# Patient Record
Sex: MALE | Race: WHITE | Employment: OTHER | URBAN - METROPOLITAN AREA
[De-identification: names, ages, dates, MRNs, and addresses within clinical notes are randomized per-mention and may not be internally consistent; named-entity substitution may affect disease eponyms.]

---

## 2022-02-11 ENCOUNTER — OFFICE VISIT (OUTPATIENT)
Dept: SURGERY | Age: 67
End: 2022-02-11
Payer: COMMERCIAL

## 2022-02-11 VITALS
HEIGHT: 70 IN | OXYGEN SATURATION: 97 % | HEART RATE: 84 BPM | BODY MASS INDEX: 28.35 KG/M2 | WEIGHT: 198 LBS | RESPIRATION RATE: 18 BRPM | DIASTOLIC BLOOD PRESSURE: 70 MMHG | SYSTOLIC BLOOD PRESSURE: 104 MMHG | TEMPERATURE: 97.3 F

## 2022-02-11 DIAGNOSIS — Z01.818 PREOP TESTING: Primary | ICD-10-CM

## 2022-02-11 PROCEDURE — 99205 OFFICE O/P NEW HI 60 MIN: CPT | Performed by: SURGERY

## 2022-02-11 RX ORDER — PREGABALIN 25 MG/1
25 CAPSULE ORAL 2 TIMES DAILY
COMMUNITY
Start: 2022-01-31

## 2022-02-11 RX ORDER — SERTRALINE HYDROCHLORIDE 100 MG/1
100 TABLET, FILM COATED ORAL DAILY
COMMUNITY
Start: 2021-10-01

## 2022-02-11 NOTE — PROGRESS NOTES
Consult    Patient: Meche Cabrera MRN: [id number]  SSN: [id number]    YOB: 1955  Age: 61 y.o. Sex: male      Subjective: Meche Cabrera is a 61 y.o. white male who presents in referral from [facility]. The patient notes a several decade history of intermittent right upper quadrant abdominal pain with associated bloating but has noted over the last 3 months 3 episodes which have greatly intensified and both frequency and symptomatology. The patient notes that the discomfort to occur abruptly in the epigastric region with radiation to his back with associated nausea and bloating region duration from 30 minutes to 12 hours. The patient notes no exacerbating or relieving circumstances and does admit to history of to collect urine and light stools. He was seen by Dr. Anna Sanchez and evaluation has documented the fact that he has acute/chronic potentially of symptoms calculus cholecystitis with a markedly thickened gallbladder wall on CT of 1.1 cm patient currently has minimal symptoms and presents for discussion of the options of L4 management  Allergies: Ampicillin, codeine  Current medications: See medication list  Past medical history:  1. History of colon polyps  2. History of stage II Hodgkin's disease status post chemotherapy   3. Basal cell carcinoma right forearm status post excision  4. Benign prostatic hypertrophy  5. Depression  6. Gastroesophageal reflux disease  7. Erectile dysfunction    Past surgical history:  1. Open right inguinal repair utilizing mesh   2. Placement of Mediport   3. Right shoulder replacement   4. Excision right forearm basal cell carcinoma  5. Carpal tunnel release  Social history: Patient does not utilize tobacco, admits to approximately 2 ounces of alcohol on daily basis  Family history:   Mother 82-adult-onset obese mellitus  Father  58 complications of a bowel obstruction  Sister 77 as healthy  One half siblings x5-healthy    Allergies   Allergen Reactions    Ampicillin Diarrhea    Codeine Nausea Only     Current Outpatient Medications   Medication Sig Dispense Refill    pregabalin (LYRICA) 25 mg capsule Take 25 mg by mouth two (2) times a day.  sertraline (ZOLOFT) 100 mg tablet Take 100 mg by mouth daily.  tamsulosin (FLOMAX) 0.4 mg capsule Take 1 Cap by mouth daily (after dinner). 90 Cap 3    multivitamin (ONE A DAY) tablet Take 1 Tab by mouth daily.  esomeprazole (NEXIUM) 40 mg capsule Take 40 mg by mouth two (2) times a day.  celecoxib (CELEBREX) 100 mg capsule Take  by mouth two (2) times a day. (Patient not taking: Reported on 2/11/2022)      diclofenac (VOLTAREN) 1 % gel  (Patient not taking: Reported on 2/11/2022)       Past Medical History:   Diagnosis Date    Acid reflux     Arthritis     Basal cell carcinoma of skin     Burning with urination     Chronic pain     in hands    Elevated PSA     Erectile dysfunction     Hemorrhoids     Hodgkin's lymphoma ([facility] [address]     Lower urinary tract symptoms (LUTS)      Past Surgical History:   Procedure Laterality Date    HX CARPAL TUNNEL RELEASE      HX HERNIA REPAIR      right inguinal repair    HX ORTHOPAEDIC      HX SHOULDER REPLACEMENT  04/28/2010    right    HX UROLOGICAL  07/11/2019    pnbx-trus vol 75.2cc. path benign. Dr. Margie aWlsh. Social History     Tobacco Use    Smoking status: Never Smoker    Smokeless tobacco: Never Used   Substance Use Topics    Alcohol use: Yes     Alcohol/week: 6.0 - 7.0 standard drinks     Types: 6 - 7 Cans of beer per week     Comment: weekly     Family History   Problem Relation Age of Onset    Hypertension Mother     Diabetes Maternal Grandfather            Review of Systems:    General: Denies fevers, chills, night sweats, fatigue, weight loss, or weight gain.     HEENT: Denies changes in auditory or visual acuity, recurrent pharyngitis, epistaxis, chronic rhinorrhea, vertigo    Respiratory: Denies increasing shortness of breath, productive cough, hemoptysis    Cardiac: Denies known history of cardiac disease, heart murmur, palpitations    GI: Denies dysphagia, recurrent emesis, hematemesis, changes in bowel habits, hematochezia, melena    : Denies hematuria frequency urgency dysuria    Musculoskeletal: Denies fractures, dislocations    Neurologic: Denies history of CVA, paralysis paresthesias, recurrent cephalgia, seizures    Endocrine: Denies polyuria, polydipsia, polyphagia, heat and cold intolerance    Lymph/heme: Denies a history of malignancy, anemia, bruising, blood transfusions    Integumentary: Negative for dermatitis     Objective:     Vitals:    02/11/22 1251   BP: 104/70   Pulse: 84   Resp: 18   Temp: 97.3 °F (36.3 °C)   SpO2: 97%   Weight: 89.8 kg (198 lb)   Height: 5' 10\" (1.778 m)        General: no acute distress, nontoxic in appearance. Head: Normocephalic, atraumatic  Mouth: Clear, no overt lesions, oral mucosa is pink and moist.  Neck: Supple, no masses, no adenopathy or carotid bruits, trachea midline  Resp: Clear to auscultation bilaterally, no wheezing, rhonchi, or rales, excursions normal and symmetrical.  Cardio: Regular rate and rhythm, no murmurs, clicks, gallops, or rubs. Abdomen: soft, nontender, nondistended, normoactive bowel sounds, no hernias. Extremities: Warm, well perfused, no tenderness or swelling, normal gait/station, without edema or varicosities  Neuro: Sensation and strength grossly intact and symmetrical.  Psych: Alert and oriented to person, place, and time.     CBC, CMP January 20, 2022 alkaline phosphatase 229, total bilirubin 1.5, white blood cell count 14.1  February 8, 2020 EGD-unremarkable  February 8, 2022 abdominal CT 1.1 cm although wall thickening with pericholecystic inflammatory changes with the potential for both acute/chronic emphysematous cholecystitis      Assessment:      Subacute/chronic calculus cholecystitis      Plan:     Avoid fatty foods  Complete antibiotic regimen  We will schedule laparoscopic open cholecystectomy with intraoperative cholangiography in approximately 1 month after the inflammatory changes have resolved as an outpatient at [facility]    Signed By: Meaghan Don DO     February 11, 2022

## 2022-02-16 ENCOUNTER — TELEPHONE (OUTPATIENT)
Dept: SURGERY | Age: 67
End: 2022-02-16

## 2022-02-16 DIAGNOSIS — R10.10 PAIN OF UPPER ABDOMEN: ICD-10-CM

## 2022-02-16 DIAGNOSIS — K80.20 GALLSTONES: ICD-10-CM

## 2022-02-16 DIAGNOSIS — Z01.818 PREOP TESTING: Primary | ICD-10-CM

## 2022-02-16 RX ORDER — AMOXICILLIN AND CLAVULANATE POTASSIUM 562.5; 437.5; 62.5 MG/1; MG/1; MG/1
1 TABLET, FILM COATED, EXTENDED RELEASE ORAL 2 TIMES DAILY
Qty: 20 TABLET | Refills: 0 | Status: SHIPPED | OUTPATIENT
Start: 2022-02-16 | End: 2022-03-07 | Stop reason: ALTCHOICE

## 2022-02-16 NOTE — TELEPHONE ENCOUNTER
Pt made aware rx for augmentin sent to his pharmacy questioned regarding reaction to ampicillin in past and he confirms it just gave him diarrhea no other reactions

## 2022-02-28 ENCOUNTER — HOSPITAL ENCOUNTER (OUTPATIENT)
Dept: LAB | Age: 67
Discharge: HOME | End: 2022-02-28

## 2022-02-28 ENCOUNTER — HOSPITAL ENCOUNTER (OUTPATIENT)
Dept: GENERAL RADIOLOGY | Age: 67
Discharge: HOME | End: 2022-02-28

## 2022-02-28 DIAGNOSIS — Z01.818 PREOP TESTING: ICD-10-CM

## 2022-02-28 LAB
ATRIAL RATE: 101 BPM
CALCULATED P AXIS, ECG09: 37 DEGREES
CALCULATED R AXIS, ECG10: 42 DEGREES
CALCULATED T AXIS, ECG11: 25 DEGREES
DIAGNOSIS, 93000: NORMAL
P-R INTERVAL, ECG05: 154 MS
Q-T INTERVAL, ECG07: 354 MS
QRS DURATION, ECG06: 78 MS
QTC CALCULATION (BEZET), ECG08: 459 MS
SENTARA SPECIMEN COL,SENBCF: NORMAL
VENTRICULAR RATE, ECG03: 101 BPM

## 2022-02-28 PROCEDURE — 99001 SPECIMEN HANDLING PT-LAB: CPT

## 2022-02-28 PROCEDURE — 71046 X-RAY EXAM CHEST 2 VIEWS: CPT

## 2022-02-28 PROCEDURE — 93005 ELECTROCARDIOGRAM TRACING: CPT

## 2022-03-01 LAB
A-G RATIO,AGRAT: 2.6 RATIO (ref 1.1–2.6)
ABSOLUTE LYMPHOCYTE COUNT, 10803: 1.7 K/UL (ref 1–4.8)
ALBUMIN SERPL-MCNC: 4.6 G/DL (ref 3.5–5)
ALP SERPL-CCNC: 116 U/L (ref 40–125)
ALT SERPL-CCNC: 31 U/L (ref 5–40)
ANION GAP SERPL CALC-SCNC: 13 MMOL/L (ref 3–15)
AST SERPL W P-5'-P-CCNC: 32 U/L (ref 10–37)
BASOPHILS # BLD: 0 K/UL (ref 0–0.2)
BASOPHILS NFR BLD: 1 % (ref 0–2)
BILIRUB SERPL-MCNC: 0.4 MG/DL (ref 0.2–1.2)
BUN SERPL-MCNC: 21 MG/DL (ref 6–22)
CALCIUM SERPL-MCNC: 9.1 MG/DL (ref 8.4–10.5)
CHLORIDE SERPL-SCNC: 103 MMOL/L (ref 98–110)
CO2 SERPL-SCNC: 24 MMOL/L (ref 20–32)
CREAT SERPL-MCNC: 0.9 MG/DL (ref 0.8–1.6)
EOSINOPHIL # BLD: 0.3 K/UL (ref 0–0.5)
EOSINOPHIL NFR BLD: 6 % (ref 0–6)
ERYTHROCYTE [DISTWIDTH] IN BLOOD BY AUTOMATED COUNT: 13.7 % (ref 10–15.5)
GFRAA, 66117: >60
GFRNA, 66118: >60
GLOBULIN,GLOB: 1.8 G/DL (ref 2–4)
GLUCOSE SERPL-MCNC: 90 MG/DL (ref 70–99)
GRANULOCYTES,GRANS: 59 % (ref 40–75)
HCT VFR BLD AUTO: 45 % (ref 39.3–51.6)
HGB BLD-MCNC: 14.5 G/DL (ref 13.1–17.2)
LYMPHOCYTES, LYMLT: 28 % (ref 20–45)
MCH RBC QN AUTO: 31 PG (ref 26–34)
MCHC RBC AUTO-ENTMCNC: 32 G/DL (ref 31–36)
MCV RBC AUTO: 96 FL (ref 80–95)
MONOCYTES # BLD: 0.4 K/UL (ref 0.1–1)
MONOCYTES NFR BLD: 7 % (ref 3–12)
NEUTROPHILS # BLD AUTO: 3.5 K/UL (ref 1.8–7.7)
PLATELET # BLD AUTO: 232 K/UL (ref 140–440)
PMV BLD AUTO: 10.9 FL (ref 9–13)
POTASSIUM SERPL-SCNC: 4.4 MMOL/L (ref 3.5–5.5)
PROT SERPL-MCNC: 6.4 G/DL (ref 6.2–8.1)
RBC # BLD AUTO: 4.71 M/UL (ref 3.8–5.8)
SODIUM SERPL-SCNC: 140 MMOL/L (ref 133–145)
WBC # BLD AUTO: 6 K/UL (ref 4–11)

## 2022-03-02 ENCOUNTER — TELEPHONE (OUTPATIENT)
Dept: SURGERY | Age: 67
End: 2022-03-02

## 2022-03-02 NOTE — TELEPHONE ENCOUNTER
Pt made aware ekg abnormal and we have scheduled appt for cardiac clearance with dr Yasmany Jones for Monday 1145 march 7

## 2022-03-07 ENCOUNTER — ANESTHESIA EVENT (OUTPATIENT)
Dept: SURGERY | Age: 67
End: 2022-03-07
Payer: COMMERCIAL

## 2022-03-07 ENCOUNTER — HOSPITAL ENCOUNTER (OUTPATIENT)
Dept: LAB | Age: 67
Discharge: HOME | End: 2022-03-07
Payer: COMMERCIAL

## 2022-03-07 ENCOUNTER — OFFICE VISIT (OUTPATIENT)
Dept: CARDIOLOGY CLINIC | Age: 67
End: 2022-03-07
Payer: COMMERCIAL

## 2022-03-07 VITALS
DIASTOLIC BLOOD PRESSURE: 80 MMHG | SYSTOLIC BLOOD PRESSURE: 120 MMHG | BODY MASS INDEX: 29.7 KG/M2 | OXYGEN SATURATION: 96 % | HEART RATE: 88 BPM | WEIGHT: 207 LBS

## 2022-03-07 DIAGNOSIS — Z01.818 PRE-OP EVALUATION: Primary | ICD-10-CM

## 2022-03-07 LAB
COVID-19 RAPID TEST, COVR: NOT DETECTED
SOURCE, COVRS: NORMAL

## 2022-03-07 PROCEDURE — 99203 OFFICE O/P NEW LOW 30 MIN: CPT | Performed by: INTERNAL MEDICINE

## 2022-03-07 PROCEDURE — 87635 SARS-COV-2 COVID-19 AMP PRB: CPT

## 2022-03-07 NOTE — LETTER
3/7/2022 11:52 AM          Mr. Yovana Landeros  [address]  [address]            Yovana Landeros was seen in our office on March 7, 2022 for cardiac evaluation. From a cardiac standpoint he is low risk for his gall bladder surgery. Please feel free to contact our office if you have any questions regarding this patient.          Sincerely,              Abhay Escudero MD

## 2022-03-07 NOTE — PROGRESS NOTES
[facility]    Mr. Bianca Redding is a 81-year-old male with a history of Hodgkin's lymphoma, DJD, calculus cholecystitis    Patient is here today for cardiac evaluation. He denies any prior history of MI and CHF. Patient was asked to see me because of abnormal EKG which was performed preoperatively. Patient has calculus cholecystitis. He is planning to have laparoscopic gallbladder surgery. EKG was performed and was abnormal.  Patient is any previous cardiac complaint. He denies any chest pain or chest tightness. He is very active. He is able to perform activity of daily living as well as exertional activities without any limitation. He can walk several miles without having to stop. He can climb more than 5-10 flight of stairs without any complaint. He denies any palpitation, presyncope or syncope. He has no limitation to his activity he can perform. Denies any nausea, vomiting, abdominal pain, fever, chills, sputum production. No hematuria or other bleeding complaints    Past Medical History:   Diagnosis Date    Acid reflux     Arthritis     Basal cell carcinoma of skin     Burning with urination     Chronic pain     in hands    Elevated PSA     Erectile dysfunction     Hemorrhoids     Hodgkin's lymphoma (HCC)     Lower urinary tract symptoms (LUTS)        Review of Systems:  Cardiac symptoms as noted above in HPI. All others negative. Denies fatigue, malaise, skin rash, joint pain, blurring vision, photophobia, neck pain, hemoptysis, chronic cough, nausea, vomiting, hematuria, burning micturition, BRBPR, chronic headaches. Current Outpatient Medications   Medication Sig    pregabalin (LYRICA) 25 mg capsule Take 25 mg by mouth two (2) times a day.  sertraline (ZOLOFT) 100 mg tablet Take 100 mg by mouth daily.  tamsulosin (FLOMAX) 0.4 mg capsule Take 1 Cap by mouth daily (after dinner).     multivitamin (ONE A DAY) tablet Take 1 Tab by mouth daily.  esomeprazole (NEXIUM) 40 mg capsule Take 40 mg by mouth two (2) times a day.  celecoxib (CELEBREX) 100 mg capsule Take  by mouth two (2) times a day. (Patient not taking: Reported on 2/11/2022)     No current facility-administered medications for this visit. Past Surgical History:   Procedure Laterality Date    HX CARPAL TUNNEL RELEASE      HX HERNIA REPAIR      right inguinal repair    HX ORTHOPAEDIC      HX SHOULDER REPLACEMENT  04/28/2010    right    HX UROLOGICAL  07/11/2019    pnbx-trus vol 75.2cc. path benign. Dr. Una Mcmullen. Allergies and Sensitivities:  Allergies   Allergen Reactions    Ampicillin Diarrhea    Codeine Nausea Only       Family History:  Family History   Problem Relation Age of Onset    Hypertension Mother     Diabetes Maternal Grandfather        Social History:  Social History     Tobacco Use    Smoking status: Never Smoker    Smokeless tobacco: Never Used   Substance Use Topics    Alcohol use: Yes     Alcohol/week: 6.0 - 7.0 standard drinks     Types: 6 - 7 Cans of beer per week     Comment: weekly    Drug use: No     He  reports that he has never smoked. He has never used smokeless tobacco.  He  reports current alcohol use of about 6.0 - 7.0 standard drinks of alcohol per week. Physical Exam:  BP Readings from Last 3 Encounters:   03/07/22 120/80   02/11/22 104/70   04/26/21 (!) 153/110         Pulse Readings from Last 3 Encounters:   03/07/22 88   02/11/22 84   04/26/21 94          Wt Readings from Last 3 Encounters:   03/07/22 93.9 kg (207 lb)   02/11/22 89.8 kg (198 lb)   08/06/19 106.6 kg (235 lb)       Constitutional: Oriented to person, place, and time. HENT: Head: Normocephalic and atraumatic. Eyes: Conjunctivae and extraocular motions are normal.   Neck: No JVD present. Carotid bruit is not appreciated. Cardiovascular: Regular rhythm.    No murmur, gallop or rubs appreciated  Lung: Breath sounds normal. No respiratory distress. No ronchi or rales appreciated  Abdominal: No tenderness. No rebound and no guarding. Musculoskeletal: There is no lower extremity edema. No cynosis  Lymphadenopathy:  No cervical or supraclavicular adenopathy appriciated. Neurological: No gross motor deficit noted. Skin: No visible skin rash noted. No Ear discharge noted  Psychiatric: Normal mood and affect. LABS:   @  Lab Results   Component Value Date/Time    WBC 6.0 2022 01:13 PM    HGB 14.5 2022 01:13 PM    HCT 45.0 2022 01:13 PM    PLATELET 372  01:13 PM    MCV 96 (H) 2022 01:13 PM     Lab Results   Component Value Date/Time    Sodium 140 2022 01:13 PM    Potassium 4.4 2022 01:13 PM    Chloride 103 2022 01:13 PM    CO2 24 2022 01:13 PM    Glucose 90 2022 01:13 PM    BUN 21 2022 01:13 PM    Creatinine 0.9 2022 01:13 PM     No flowsheet data found. Lab Results   Component Value Date/Time    ALT (SGPT) 31 2022 01:13 PM     No results found for: HBA1C, HIT0WMKU, QFH8EFWC  No results found for: TSH, TSH2, TSH3, TSHP, TSHEXT    EK2022:Sinus rhythm at 100 bpm.  No ST changes of ischemia. Poor R wave progression. No Q waves    STRESS TEST (EST, PHARM, NUC, ECHO etc)    CATHETERIZATION    IMPRESSION & PLAN:  Mr. Bianca Redding is a 82-year-old male    Preoperative evaluation:  Patient is planning to have laparoscopic/open gallbladder surgery for subacute/chronic calculus cholecystitis. EKG was performed which showed poor R wave progression. I reviewed personally that EKG. There is no pathologic Q waves. There is no ST changes of ischemia  Patient is asymptomatic from cardiovascular standpoint. He does not have any symptoms concerning for unstable angina or decompensated heart failure but there is no evidence of fluid overload. He denies any prior history of MI, CHF, DM, CKD or stroke.   In absence of any cardiac symptoms, no further cardiac work-up is necessary. His functional status appears to be more than 6 METS based on history  Patient is acceptable candidate to proceed with planned surgery without any further cardiac work-up at this time in absence of any cardiac symptoms whatsoever    Importance of diet and exercise was discussed with patient. This plan was discussed with patient who is in agreement. Thank you for allowing me to participate in patient care. Please feel free to call me if you have any question or concern. Fazal Dhillon MD  Please note: This document has been produced using voice recognition software. Unrecognized errors in transcription may be present.

## 2022-03-07 NOTE — Clinical Note
3/7/2022    Patient: Lauren Lopez   YOB: 1955   Date of Visit: 3/7/2022     Evan Field MD  [address] [facility] [address] [address]  Via Fax: [phone]    Dear Evan Field MD,      Thank you for referring Mr. Lauren Lopez to [address] [place] [address] for evaluation. My notes for this consultation are attached. If you have questions, please do not hesitate to call me. I look forward to following your patient along with you.       Sincerely,    Hannah Rees MD

## 2022-03-08 ENCOUNTER — APPOINTMENT (OUTPATIENT)
Dept: GENERAL RADIOLOGY | Age: 67
End: 2022-03-08
Attending: SURGERY
Payer: COMMERCIAL

## 2022-03-08 ENCOUNTER — ANESTHESIA (OUTPATIENT)
Dept: SURGERY | Age: 67
End: 2022-03-08
Payer: COMMERCIAL

## 2022-03-08 ENCOUNTER — HOSPITAL ENCOUNTER (OUTPATIENT)
Age: 67
Discharge: HOME | End: 2022-03-08
Attending: SURGERY | Admitting: SURGERY
Payer: COMMERCIAL

## 2022-03-08 VITALS
HEART RATE: 70 BPM | WEIGHT: 204 LBS | TEMPERATURE: 97.9 F | DIASTOLIC BLOOD PRESSURE: 69 MMHG | RESPIRATION RATE: 16 BRPM | OXYGEN SATURATION: 94 % | SYSTOLIC BLOOD PRESSURE: 106 MMHG | BODY MASS INDEX: 29.2 KG/M2 | HEIGHT: 70 IN

## 2022-03-08 DIAGNOSIS — Z90.49 STATUS POST LAPAROSCOPIC CHOLECYSTECTOMY: ICD-10-CM

## 2022-03-08 DIAGNOSIS — K80.10 CHRONIC CHOLECYSTITIS WITH CALCULUS: Primary | ICD-10-CM

## 2022-03-08 PROCEDURE — 76010000131 HC OR TIME 2 TO 2.5 HR: Performed by: SURGERY

## 2022-03-08 PROCEDURE — 00790 ANES IPER UPR ABD NOS: CPT | Performed by: NURSE ANESTHETIST, CERTIFIED REGISTERED

## 2022-03-08 PROCEDURE — 74011250637 HC RX REV CODE- 250/637: Performed by: SURGERY

## 2022-03-08 PROCEDURE — 77030009851 HC PCH RTVR ENDOSC AMR -B: Performed by: SURGERY

## 2022-03-08 PROCEDURE — 77030010031 HC SCIS ENDOSC MPLR J&J -C: Performed by: SURGERY

## 2022-03-08 PROCEDURE — 77030018875 HC APPL CLP LIG4 J&J -B: Performed by: SURGERY

## 2022-03-08 PROCEDURE — 74011000636 HC RX REV CODE- 636: Performed by: SURGERY

## 2022-03-08 PROCEDURE — 77030018836 HC SOL IRR NACL ICUM -A: Performed by: SURGERY

## 2022-03-08 PROCEDURE — 77030031139 HC SUT VCRL2 J&J -A: Performed by: SURGERY

## 2022-03-08 PROCEDURE — 77030008602 HC TRCR ENDOSC EPATH J&J -B: Performed by: SURGERY

## 2022-03-08 PROCEDURE — C9290 INJ, BUPIVACAINE LIPOSOME: HCPCS | Performed by: SURGERY

## 2022-03-08 PROCEDURE — 74011000250 HC RX REV CODE- 250: Performed by: NURSE ANESTHETIST, CERTIFIED REGISTERED

## 2022-03-08 PROCEDURE — 74011250636 HC RX REV CODE- 250/636: Performed by: NURSE ANESTHETIST, CERTIFIED REGISTERED

## 2022-03-08 PROCEDURE — 77030002933 HC SUT MCRYL J&J -A: Performed by: SURGERY

## 2022-03-08 PROCEDURE — 76060000035 HC ANESTHESIA 2 TO 2.5 HR: Performed by: SURGERY

## 2022-03-08 PROCEDURE — 77030012407 HC DRN WND BARD -B: Performed by: SURGERY

## 2022-03-08 PROCEDURE — 47563 LAPARO CHOLECYSTECTOMY/GRAPH: CPT | Performed by: SURGERY

## 2022-03-08 PROCEDURE — 76210000016 HC OR PH I REC 1 TO 1.5 HR: Performed by: SURGERY

## 2022-03-08 PROCEDURE — 77030040922 HC BLNKT HYPOTHRM STRY -A: Performed by: SURGERY

## 2022-03-08 PROCEDURE — 74011000272 HC RX REV CODE- 272: Performed by: SURGERY

## 2022-03-08 PROCEDURE — 77030040361 HC SLV COMPR DVT MDII -B: Performed by: SURGERY

## 2022-03-08 PROCEDURE — 77030008603 HC TRCR ENDOSC EPATH J&J -C: Performed by: SURGERY

## 2022-03-08 PROCEDURE — 77030013079 HC BLNKT BAIR HGGR 3M -A: Performed by: ANESTHESIOLOGY

## 2022-03-08 PROCEDURE — 74011000258 HC RX REV CODE- 258: Performed by: NURSE ANESTHETIST, CERTIFIED REGISTERED

## 2022-03-08 PROCEDURE — 74300 X-RAY BILE DUCTS/PANCREAS: CPT

## 2022-03-08 PROCEDURE — 00790 ANES IPER UPR ABD NOS: CPT | Performed by: ANESTHESIOLOGY

## 2022-03-08 PROCEDURE — 2709999900 HC NON-CHARGEABLE SUPPLY: Performed by: SURGERY

## 2022-03-08 PROCEDURE — 77030008756 HC TU IRR SUC STRY -B: Performed by: SURGERY

## 2022-03-08 PROCEDURE — C1758 CATHETER, URETERAL: HCPCS | Performed by: SURGERY

## 2022-03-08 PROCEDURE — 74011000258 HC RX REV CODE- 258: Performed by: SURGERY

## 2022-03-08 PROCEDURE — 74011250636 HC RX REV CODE- 250/636: Performed by: SURGERY

## 2022-03-08 PROCEDURE — 88304 TISSUE EXAM BY PATHOLOGIST: CPT

## 2022-03-08 PROCEDURE — 76210000021 HC REC RM PH II 0.5 TO 1 HR: Performed by: SURGERY

## 2022-03-08 PROCEDURE — 77030008683 HC TU ET CUF COVD -A: Performed by: ANESTHESIOLOGY

## 2022-03-08 PROCEDURE — 74011000250 HC RX REV CODE- 250: Performed by: SURGERY

## 2022-03-08 RX ORDER — GLYCOPYRROLATE 0.2 MG/ML
INJECTION INTRAMUSCULAR; INTRAVENOUS AS NEEDED
Status: DISCONTINUED | OUTPATIENT
Start: 2022-03-08 | End: 2022-03-08 | Stop reason: HOSPADM

## 2022-03-08 RX ORDER — CHLORHEXIDINE GLUCONATE 4 G/100ML
SOLUTION TOPICAL AS NEEDED
Status: DISCONTINUED | OUTPATIENT
Start: 2022-03-08 | End: 2022-03-08 | Stop reason: HOSPADM

## 2022-03-08 RX ORDER — IBUPROFEN 800 MG/1
800 TABLET ORAL
Qty: 30 TABLET | Refills: 1 | Status: SHIPPED | OUTPATIENT
Start: 2022-03-08

## 2022-03-08 RX ORDER — SODIUM CHLORIDE, SODIUM LACTATE, POTASSIUM CHLORIDE, CALCIUM CHLORIDE 600; 310; 30; 20 MG/100ML; MG/100ML; MG/100ML; MG/100ML
150 INJECTION, SOLUTION INTRAVENOUS CONTINUOUS
Status: DISCONTINUED | OUTPATIENT
Start: 2022-03-08 | End: 2022-03-08 | Stop reason: HOSPADM

## 2022-03-08 RX ORDER — OXYCODONE AND ACETAMINOPHEN 5; 325 MG/1; MG/1
1 TABLET ORAL
Qty: 15 TABLET | Refills: 0 | Status: SHIPPED | OUTPATIENT
Start: 2022-03-08 | End: 2022-03-11

## 2022-03-08 RX ORDER — ONDANSETRON 2 MG/ML
INJECTION INTRAMUSCULAR; INTRAVENOUS AS NEEDED
Status: DISCONTINUED | OUTPATIENT
Start: 2022-03-08 | End: 2022-03-08 | Stop reason: HOSPADM

## 2022-03-08 RX ORDER — DIPHENHYDRAMINE HYDROCHLORIDE 50 MG/ML
12.5 INJECTION, SOLUTION INTRAMUSCULAR; INTRAVENOUS
Status: DISCONTINUED | OUTPATIENT
Start: 2022-03-08 | End: 2022-03-08 | Stop reason: HOSPADM

## 2022-03-08 RX ORDER — LIDOCAINE HYDROCHLORIDE 20 MG/ML
INJECTION, SOLUTION EPIDURAL; INFILTRATION; INTRACAUDAL; PERINEURAL AS NEEDED
Status: DISCONTINUED | OUTPATIENT
Start: 2022-03-08 | End: 2022-03-08 | Stop reason: HOSPADM

## 2022-03-08 RX ORDER — SODIUM CHLORIDE, SODIUM LACTATE, POTASSIUM CHLORIDE, CALCIUM CHLORIDE 600; 310; 30; 20 MG/100ML; MG/100ML; MG/100ML; MG/100ML
50 INJECTION, SOLUTION INTRAVENOUS CONTINUOUS
Status: DISCONTINUED | OUTPATIENT
Start: 2022-03-08 | End: 2022-03-08 | Stop reason: HOSPADM

## 2022-03-08 RX ORDER — OXYCODONE AND ACETAMINOPHEN 5; 325 MG/1; MG/1
1 TABLET ORAL ONCE
Status: COMPLETED | OUTPATIENT
Start: 2022-03-08 | End: 2022-03-08

## 2022-03-08 RX ORDER — ONDANSETRON 2 MG/ML
4 INJECTION INTRAMUSCULAR; INTRAVENOUS ONCE
Status: COMPLETED | OUTPATIENT
Start: 2022-03-08 | End: 2022-03-08

## 2022-03-08 RX ORDER — FENTANYL CITRATE 50 UG/ML
INJECTION, SOLUTION INTRAMUSCULAR; INTRAVENOUS AS NEEDED
Status: DISCONTINUED | OUTPATIENT
Start: 2022-03-08 | End: 2022-03-08 | Stop reason: HOSPADM

## 2022-03-08 RX ORDER — SODIUM CHLORIDE, SODIUM LACTATE, POTASSIUM CHLORIDE, CALCIUM CHLORIDE 600; 310; 30; 20 MG/100ML; MG/100ML; MG/100ML; MG/100ML
100 INJECTION, SOLUTION INTRAVENOUS CONTINUOUS
Status: DISCONTINUED | OUTPATIENT
Start: 2022-03-08 | End: 2022-03-08 | Stop reason: HOSPADM

## 2022-03-08 RX ORDER — KETOROLAC TROMETHAMINE 15 MG/ML
15 INJECTION, SOLUTION INTRAMUSCULAR; INTRAVENOUS ONCE
Status: COMPLETED | OUTPATIENT
Start: 2022-03-08 | End: 2022-03-08

## 2022-03-08 RX ORDER — CEFAZOLIN SODIUM 1 G/3ML
INJECTION, POWDER, FOR SOLUTION INTRAMUSCULAR; INTRAVENOUS AS NEEDED
Status: DISCONTINUED | OUTPATIENT
Start: 2022-03-08 | End: 2022-03-08 | Stop reason: HOSPADM

## 2022-03-08 RX ORDER — NEOSTIGMINE METHYLSULFATE 1 MG/ML
INJECTION, SOLUTION INTRAVENOUS AS NEEDED
Status: DISCONTINUED | OUTPATIENT
Start: 2022-03-08 | End: 2022-03-08 | Stop reason: HOSPADM

## 2022-03-08 RX ORDER — PROPOFOL 10 MG/ML
INJECTION, EMULSION INTRAVENOUS AS NEEDED
Status: DISCONTINUED | OUTPATIENT
Start: 2022-03-08 | End: 2022-03-08 | Stop reason: HOSPADM

## 2022-03-08 RX ORDER — FENTANYL CITRATE 50 UG/ML
25 INJECTION, SOLUTION INTRAMUSCULAR; INTRAVENOUS
Status: DISCONTINUED | OUTPATIENT
Start: 2022-03-08 | End: 2022-03-08 | Stop reason: HOSPADM

## 2022-03-08 RX ORDER — SUCCINYLCHOLINE CHLORIDE 20 MG/ML
INJECTION INTRAMUSCULAR; INTRAVENOUS AS NEEDED
Status: DISCONTINUED | OUTPATIENT
Start: 2022-03-08 | End: 2022-03-08 | Stop reason: HOSPADM

## 2022-03-08 RX ORDER — ALBUTEROL SULFATE 0.83 MG/ML
2.5 SOLUTION RESPIRATORY (INHALATION) AS NEEDED
Status: DISCONTINUED | OUTPATIENT
Start: 2022-03-08 | End: 2022-03-08 | Stop reason: HOSPADM

## 2022-03-08 RX ORDER — FAMOTIDINE 20 MG/1
20 TABLET, FILM COATED ORAL ONCE
Status: DISCONTINUED | OUTPATIENT
Start: 2022-03-08 | End: 2022-03-08 | Stop reason: HOSPADM

## 2022-03-08 RX ORDER — METOPROLOL TARTRATE 5 MG/5ML
INJECTION INTRAVENOUS AS NEEDED
Status: DISCONTINUED | OUTPATIENT
Start: 2022-03-08 | End: 2022-03-08 | Stop reason: HOSPADM

## 2022-03-08 RX ORDER — ENOXAPARIN SODIUM 100 MG/ML
40 INJECTION SUBCUTANEOUS ONCE
Status: COMPLETED | OUTPATIENT
Start: 2022-03-08 | End: 2022-03-08

## 2022-03-08 RX ORDER — ROCURONIUM BROMIDE 10 MG/ML
INJECTION, SOLUTION INTRAVENOUS AS NEEDED
Status: DISCONTINUED | OUTPATIENT
Start: 2022-03-08 | End: 2022-03-08 | Stop reason: HOSPADM

## 2022-03-08 RX ORDER — HYDROMORPHONE HYDROCHLORIDE 2 MG/ML
INJECTION, SOLUTION INTRAMUSCULAR; INTRAVENOUS; SUBCUTANEOUS AS NEEDED
Status: DISCONTINUED | OUTPATIENT
Start: 2022-03-08 | End: 2022-03-08 | Stop reason: HOSPADM

## 2022-03-08 RX ORDER — LIDOCAINE HYDROCHLORIDE 10 MG/ML
0.1 INJECTION, SOLUTION EPIDURAL; INFILTRATION; INTRACAUDAL; PERINEURAL AS NEEDED
Status: DISCONTINUED | OUTPATIENT
Start: 2022-03-08 | End: 2022-03-08 | Stop reason: HOSPADM

## 2022-03-08 RX ORDER — HYDROMORPHONE HYDROCHLORIDE 2 MG/ML
0.5 INJECTION, SOLUTION INTRAMUSCULAR; INTRAVENOUS; SUBCUTANEOUS
Status: DISCONTINUED | OUTPATIENT
Start: 2022-03-08 | End: 2022-03-08 | Stop reason: HOSPADM

## 2022-03-08 RX ORDER — DEXAMETHASONE SODIUM PHOSPHATE 4 MG/ML
INJECTION, SOLUTION INTRA-ARTICULAR; INTRALESIONAL; INTRAMUSCULAR; INTRAVENOUS; SOFT TISSUE AS NEEDED
Status: DISCONTINUED | OUTPATIENT
Start: 2022-03-08 | End: 2022-03-08 | Stop reason: HOSPADM

## 2022-03-08 RX ORDER — MIDAZOLAM HYDROCHLORIDE 1 MG/ML
INJECTION, SOLUTION INTRAMUSCULAR; INTRAVENOUS AS NEEDED
Status: DISCONTINUED | OUTPATIENT
Start: 2022-03-08 | End: 2022-03-08 | Stop reason: HOSPADM

## 2022-03-08 RX ADMIN — METOPROLOL TARTRATE 2 MG: 5 INJECTION, SOLUTION INTRAVENOUS at 08:20

## 2022-03-08 RX ADMIN — Medication 3 MG: at 09:25

## 2022-03-08 RX ADMIN — DEXAMETHASONE SODIUM PHOSPHATE 4 MG: 4 INJECTION, SOLUTION INTRAMUSCULAR; INTRAVENOUS at 07:54

## 2022-03-08 RX ADMIN — SODIUM CHLORIDE, POTASSIUM CHLORIDE, SODIUM LACTATE AND CALCIUM CHLORIDE 50 ML/HR: 600; 310; 30; 20 INJECTION, SOLUTION INTRAVENOUS at 06:21

## 2022-03-08 RX ADMIN — ROCURONIUM BROMIDE 30 MG: 50 INJECTION INTRAVENOUS at 07:55

## 2022-03-08 RX ADMIN — HYDROMORPHONE HYDROCHLORIDE 0.5 MG: 2 INJECTION, SOLUTION INTRAMUSCULAR; INTRAVENOUS; SUBCUTANEOUS at 10:43

## 2022-03-08 RX ADMIN — GLYCOPYRROLATE 0.2 MG: 0.2 INJECTION INTRAMUSCULAR; INTRAVENOUS at 07:35

## 2022-03-08 RX ADMIN — DEXMEDETOMIDINE HYDROCHLORIDE 10 MCG: 100 INJECTION, SOLUTION, CONCENTRATE INTRAVENOUS at 07:35

## 2022-03-08 RX ADMIN — SUCCINYLCHOLINE CHLORIDE 160 MG: 20 INJECTION, SOLUTION INTRAMUSCULAR; INTRAVENOUS at 07:45

## 2022-03-08 RX ADMIN — MIDAZOLAM HYDROCHLORIDE 2 MG: 2 INJECTION, SOLUTION INTRAMUSCULAR; INTRAVENOUS at 07:35

## 2022-03-08 RX ADMIN — HYDROMORPHONE HYDROCHLORIDE 0.5 MG: 2 INJECTION, SOLUTION INTRAMUSCULAR; INTRAVENOUS; SUBCUTANEOUS at 10:25

## 2022-03-08 RX ADMIN — ROCURONIUM BROMIDE 10 MG: 50 INJECTION INTRAVENOUS at 09:02

## 2022-03-08 RX ADMIN — ONDANSETRON 4 MG: 2 INJECTION INTRAMUSCULAR; INTRAVENOUS at 08:30

## 2022-03-08 RX ADMIN — FENTANYL CITRATE 100 MCG: 50 INJECTION, SOLUTION INTRAMUSCULAR; INTRAVENOUS at 07:51

## 2022-03-08 RX ADMIN — ONDANSETRON 4 MG: 2 INJECTION INTRAMUSCULAR; INTRAVENOUS at 10:12

## 2022-03-08 RX ADMIN — HYDROMORPHONE HYDROCHLORIDE 0.5 MG: 2 INJECTION, SOLUTION INTRAMUSCULAR; INTRAVENOUS; SUBCUTANEOUS at 10:10

## 2022-03-08 RX ADMIN — GLYCOPYRROLATE 0.4 MG: 0.2 INJECTION INTRAMUSCULAR; INTRAVENOUS at 09:25

## 2022-03-08 RX ADMIN — CEFAZOLIN 2 G: 330 INJECTION, POWDER, FOR SOLUTION INTRAMUSCULAR; INTRAVENOUS at 07:52

## 2022-03-08 RX ADMIN — KETOROLAC TROMETHAMINE 15 MG: 15 INJECTION, SOLUTION INTRAMUSCULAR; INTRAVENOUS at 10:17

## 2022-03-08 RX ADMIN — PROPOFOL 50 MG: 10 INJECTION, EMULSION INTRAVENOUS at 07:51

## 2022-03-08 RX ADMIN — OXYCODONE HYDROCHLORIDE AND ACETAMINOPHEN 1 TABLET: 5; 325 TABLET ORAL at 12:28

## 2022-03-08 RX ADMIN — DEXMEDETOMIDINE HYDROCHLORIDE 4 MCG: 100 INJECTION, SOLUTION, CONCENTRATE INTRAVENOUS at 09:38

## 2022-03-08 RX ADMIN — ENOXAPARIN SODIUM 40 MG: 100 INJECTION SUBCUTANEOUS at 06:33

## 2022-03-08 RX ADMIN — PROPOFOL 150 MG: 10 INJECTION, EMULSION INTRAVENOUS at 07:45

## 2022-03-08 RX ADMIN — DEXMEDETOMIDINE HYDROCHLORIDE 6 MCG: 100 INJECTION, SOLUTION, CONCENTRATE INTRAVENOUS at 07:51

## 2022-03-08 RX ADMIN — ROCURONIUM BROMIDE 10 MG: 50 INJECTION INTRAVENOUS at 07:45

## 2022-03-08 RX ADMIN — LIDOCAINE HYDROCHLORIDE 80 MG: 20 INJECTION, SOLUTION EPIDURAL; INFILTRATION; INTRACAUDAL; PERINEURAL at 07:45

## 2022-03-08 RX ADMIN — HYDROMORPHONE HYDROCHLORIDE 1 MG: 2 INJECTION, SOLUTION INTRAMUSCULAR; INTRAVENOUS; SUBCUTANEOUS at 07:35

## 2022-03-08 NOTE — ANESTHESIA POSTPROCEDURE EVALUATION
Procedure(s):  LAPAROSCOPIC CHOLECYSTECTOMY WITH  CHOLANGIOGRAM/C-ARM. general    Anesthesia Post Evaluation      Multimodal analgesia: multimodal analgesia used between 6 hours prior to anesthesia start to PACU discharge  Patient location during evaluation: bedside  Patient participation: complete - patient participated  Level of consciousness: awake  Pain management: adequate  Airway patency: patent  Anesthetic complications: no  Cardiovascular status: stable  Respiratory status: acceptable  Hydration status: acceptable  Post anesthesia nausea and vomiting:  controlled      INITIAL Post-op Vital signs:   Vitals Value Taken Time   /65 03/08/22 1208   Temp 36.6 °C (97.9 °F) 03/08/22 1135   Pulse 74 03/08/22 1216   Resp 13 03/08/22 1216   SpO2 94 % 03/08/22 1216   Vitals shown include unvalidated device data.

## 2022-03-08 NOTE — DISCHARGE INSTRUCTIONS
Regular diet   Resume preadmission medications, Percocet/Motrin as needed analgesia   Drain care with daily showers to begin March 9, 2022. Strip quantitate and recharge drain and record output twice daily or as needed   Discontinue dressings March 9, 2022, trim Steri-Strips as needed   [place] as needed    Patient Education        Gallbladder Removal Surgery: What to Expect at [address] [place] [address]  After your surgery, you will likely feel weak and tired for several days after you return home. Your belly may be swollen. If you had laparoscopic surgery, it's normal to also have some shoulder pain. This is caused by the air that your doctor put in your belly to help see your organs better. You may have gas or need to burp a lot at first. A few people get diarrhea. The diarrhea usually goes away in 2 to 4 weeks, but it may last longer. How quickly you recover depends on whether you had a laparoscopic or open surgery. · For a laparoscopic surgery, most people can go back to work or their normal routine in 1 to 2 weeks. But it may take longer, depending on the type of work you do. · For an open surgery, it will probably take 4 to 6 weeks before you get back to your normal routine. This care sheet gives you a general idea about how long it will take for you to recover. However, each person recovers at a different pace. Follow the steps below to get better as quickly as possible. How can you care for yourself at home? Activity    · Rest when you feel tired. Getting enough sleep will help you recover.     · Try to walk each day. Start out by walking a little more than you did the day before. Walking helps prevent blood clots in your legs and pneumonia.     · For about 2 to 4 weeks, avoid lifting anything that would make you strain.  This may include a child, heavy grocery bags and milk containers, a heavy briefcase or backpack, cat litter or dog food bags, or a vacuum .     · Avoid strenuous activities, such as biking, jogging, weightlifting, and aerobic exercise, until your doctor says it is okay.     · Ask your doctor when you can drive again.     · For a laparoscopic surgery, most people can go back to work or their normal routine in 1 to 2 weeks, but it may take longer. For an open surgery, it will probably take 4 to 6 weeks before you get back to your normal routine.     · Your doctor will tell you when you can have sex again. Diet    · When you feel like eating, start with small amounts of food. You may want to avoid fatty foods like fried foods or cheese for a while. They can cause symptoms, such as diarrhea or bloating.     · Drink plenty of fluids (unless your doctor tells you not to).     · You may notice that your bowel movements are not regular right after your surgery. This is common. Try to avoid constipation and straining with bowel movements. You may want to take a fiber supplement every day. If you have not had a bowel movement after a couple of days, ask your doctor about taking a mild laxative. Medicines    · Your doctor will tell you if and when you can restart your medicines. You will also be given instructions about taking any new medicines.     · If you take aspirin or some other blood thinner, ask your doctor if and when to start taking it again. Make sure that you understand exactly what your doctor wants you to do.     · Be safe with medicines. Read and follow all instructions on the label. ? If the doctor gave you a prescription medicine for pain, take it as prescribed. ? If you are not taking a prescription pain medicine, ask your doctor if you can take an over-the-counter medicine. ? Do not take two or more pain medicines at the same time unless the doctor told you to. Many pain medicines contain acetaminophen, which is Tylenol.  Too much Tylenol can be harmful.     · If you think your pain medicine is making you sick to your stomach:  ? Take your medicine after meals (unless your doctor tells you not to). ? Ask your doctor for a different pain medicine.     · If your doctor prescribed antibiotics, take them as directed. Do not stop taking them just because you feel better. You need to take the full course of antibiotics. Incision care    · If you have strips of tape on the cut (incision) the doctor made, leave the tape on for a week or until it falls off.     · You may shower 24 to 48 hours after surgery, if your doctor okays it. Pat the incision dry. Do not take a bath for the first 2 weeks, or until your doctor tells you it's okay.     · You may have staples to hold the cut together. Keep them dry until your doctor takes them out. This is usually in 7 to 10 days.     · Keep the area clean and dry. You may cover it with a gauze bandage if it oozes fluid or rubs against clothing. Change the bandage every day. Ice    · To reduce swelling and pain, put ice or a cold pack on your belly for 10 to 20 minutes at a time. Do this every 1 to 2 hours. Put a thin cloth between the ice and your skin. Follow-up care is a key part of your treatment and safety. Be sure to make and go to all appointments, and call your doctor if you are having problems. It's also a good idea to know your test results and keep a list of the medicines you take. When should you call for help? Call 911 anytime you think you may need emergency care. For example, call if:    · You passed out (lost consciousness).     · You are short of breath. .   Call your doctor now or seek immediate medical care if:    · You are sick to your stomach and cannot drink fluids.     · You have pain that does not get better when you take your pain medicine.     · You cannot pass stools or gas.     · You have signs of infection, such as:  ? Increased pain, swelling, warmth, or redness. ? Red streaks leading from the incision. ? Pus draining from the incision. ?  A fever.     · Bright red blood has soaked through the bandage over your incision.     · You have loose stitches, or your incision comes open.     · You have signs of a blood clot in your leg (called a deep vein thrombosis), such as:  ? Pain in your calf, back of knee, thigh, or groin. ? Redness and swelling in your leg or groin. Watch closely for any changes in your health, and be sure to contact your doctor if you have any problems. Where can you learn more? Go to http://www.gray.com/  Enter F357 in the search box to learn more about \"Gallbladder Removal Surgery: What to Expect at Home. \"  Current as of: September 8, 2021               Content Version: 13.2  © 4906-6178 [employer]. Care instructions adapted under license by [employer] (which disclaims liability or warranty for this information). If you have questions about a medical condition or this instruction, always ask your healthcare professional. [place] [address] any warranty or liability for your use of this information. DISCHARGE SUMMARY from Nurse    PATIENT INSTRUCTIONS:    After general anesthesia or intravenous sedation, for 24 hours or while taking prescription Narcotics:  · Limit your activities  · Do not drive and operate hazardous machinery  · Do not make important personal or business decisions  · Do  not drink alcoholic beverages  · If you have not urinated within 8 hours after discharge, please contact your surgeon on call. Report the following to your surgeon:  · Excessive pain, swelling, redness or odor of or around the surgical area  · Temperature over 100.5  · Nausea and vomiting lasting longer than 4 hours or if unable to take medications  · Any signs of decreased circulation or nerve impairment to extremity: change in color, persistent  numbness, tingling, coldness or increase pain  · Any questions    What to do at Home:  *. *  Please give a list of your current medications to your Primary Care Provider.     *  Please update this list whenever your medications are discontinued, doses are      changed, or new medications (including over-the-counter products) are added. *  Please carry medication information at all times in case of emergency situations. These are general instructions for a healthy lifestyle:    No smoking/ No tobacco products/ Avoid exposure to second hand smoke  Surgeon General's Warning:  Quitting smoking now greatly reduces serious risk to your health. Obesity, smoking, and sedentary lifestyle greatly increases your risk for illness    A healthy diet, regular physical exercise & weight monitoring are important for maintaining a healthy lifestyle    You may be retaining fluid if you have a history of heart failure or if you experience any of the following symptoms:  Weight gain of 3 pounds or more overnight or 5 pounds in a week, increased swelling in our hands or feet or shortness of breath while lying flat in bed. Please call your doctor as soon as you notice any of these symptoms; do not wait until your next office visit. The discharge information has been reviewed with the patient. The patient verbalized understanding. Discharge medications reviewed with the patient and appropriate educational materials and side effects teaching were provided.   ___________________________________________________________________________________________________________________________________

## 2022-03-08 NOTE — ANESTHESIA PREPROCEDURE EVALUATION
Relevant Problems   No relevant active problems       Anesthetic History   No history of anesthetic complications            Review of Systems / Medical History  Patient summary reviewed and pertinent labs reviewed    Pulmonary  Within defined limits                 Neuro/Psych   Within defined limits           Cardiovascular    Hypertension: well controlled              Exercise tolerance: >4 METS     GI/Hepatic/Renal     GERD: well controlled           Endo/Other        Arthritis and cancer     Other Findings              Physical Exam    Airway  Mallampati: III  TM Distance: 4 - 6 cm  Neck ROM: decreased range of motion   Mouth opening: Normal     Cardiovascular    Rhythm: regular  Rate: normal         Dental    Dentition: Poor dentition     Pulmonary  Breath sounds clear to auscultation               Abdominal  GI exam deferred       Other Findings            Anesthetic Plan    ASA: 3  Anesthesia type: general          Induction: Intravenous  Anesthetic plan and risks discussed with: Patient

## 2022-03-08 NOTE — H&P
Office Visit    2/11/2022  [place] [address]   Claudia Phipps [facility] [place] [facility]  Preop testing    Dx  Advice Only ; Referred by None    Reason for Visit       Progress Notes  Claudia Phipps DO (Physician) [address] Surgery              Consult     Patient: Carly Peterson MRN: [id number]  SSN: [id number]    YOB: 1955  Age: 61 y.o. Sex: male       Subjective: Carly Peterson is a 61 y.o. white male who presents in referral from [facility] (formerly [facility]3]. The patient notes a several decade history of intermittent right upper quadrant abdominal pain with associated bloating but has noted over the last 3 months 3 episodes which have greatly intensified and both frequency and symptomatology. The patient notes that the discomfort to occur abruptly in the epigastric region with radiation to his back with associated nausea and bloating region duration from 30 minutes to 12 hours. The patient notes no exacerbating or relieving circumstances and does admit to history of to collect urine and light stools. He was seen by Dr. Ghassan Garner and evaluation has documented the fact that he has acute/chronic potentially of symptoms calculus cholecystitis with a markedly thickened gallbladder wall on CT of 1.1 cm patient currently has minimal symptoms and presents for discussion of the options of L4 management  Allergies: Ampicillin, codeine  Current medications: See medication list  Past medical history:  1. History of colon polyps  2. History of stage II Hodgkin's disease status post chemotherapy 2005  3. Basal cell carcinoma right forearm status post excision  4. Benign prostatic hypertrophy  5. Depression  6. Gastroesophageal reflux disease  7. Erectile dysfunction     Past surgical history:  1. Open right inguinal repair utilizing mesh 2001  2. Placement of Mediport 2005  3. Right shoulder replacement 2010  4. Excision right forearm basal cell carcinoma  5.   Carpal tunnel release  Social history: Patient does not utilize tobacco, admits to approximately 2 ounces of alcohol on daily basis  Family history: Mother 82adult-onset obese mellitus  Father  58 complications of a bowel obstruction  Sister 77 as healthy  One half siblings x5healthy          Allergies   Allergen Reactions    Ampicillin Diarrhea    Codeine Nausea Only             Current Outpatient Medications   Medication Sig Dispense Refill    pregabalin (LYRICA) 25 mg capsule Take 25 mg by mouth two (2) times a day.        sertraline (ZOLOFT) 100 mg tablet Take 100 mg by mouth daily.        tamsulosin (FLOMAX) 0.4 mg capsule Take 1 Cap by mouth daily (after dinner). 90 Cap 3    multivitamin (ONE A DAY) tablet Take 1 Tab by mouth daily.        esomeprazole (NEXIUM) 40 mg capsule Take 40 mg by mouth two (2) times a day.        celecoxib (CELEBREX) 100 mg capsule Take  by mouth two (2) times a day. (Patient not taking: Reported on 2022)        diclofenac (VOLTAREN) 1 % gel  (Patient not taking: Reported on 2022)               Past Medical History:   Diagnosis Date    Acid reflux      Arthritis      Basal cell carcinoma of skin      Burning with urination      Chronic pain       in hands    Elevated PSA      Erectile dysfunction      Hemorrhoids      Hodgkin's lymphoma ([address]      Lower urinary tract symptoms (LUTS)              Past Surgical History:   Procedure Laterality Date    HX CARPAL TUNNEL RELEASE        HX HERNIA REPAIR         right inguinal repair    HX ORTHOPAEDIC        HX SHOULDER REPLACEMENT   2010     right    HX UROLOGICAL   2019     pnbx-trus vol 75.2cc. path benign. Dr. Monica White. Social History            Tobacco Use    Smoking status: Never Smoker    Smokeless tobacco: Never Used   Substance Use Topics    Alcohol use:  Yes       Alcohol/week: 6.0 - 7.0 standard drinks       Types: 6 - 7 Cans of beer per week       Comment: weekly            Family History   Problem Relation Age of Onset    Hypertension Mother      Diabetes Maternal Grandfather              Review of Systems:     General: Denies fevers, chills, night sweats, fatigue, weight loss, or weight gain. HEENT: Denies changes in auditory or visual acuity, recurrent pharyngitis, epistaxis, chronic rhinorrhea, vertigo    Respiratory: Denies increasing shortness of breath, productive cough, hemoptysis    Cardiac: Denies known history of cardiac disease, heart murmur, palpitations     GI: Denies dysphagia, recurrent emesis, hematemesis, changes in bowel habits, hematochezia, melena    : Denies hematuria frequency urgency dysuria    Musculoskeletal: Denies fractures, dislocations    Neurologic: Denies history of CVA, paralysis paresthesias, recurrent cephalgia, seizures    Endocrine: Denies polyuria, polydipsia, polyphagia, heat and cold intolerance    Lymph/heme: Denies a history of malignancy, anemia, bruising, blood transfusions     Integumentary: Negative for dermatitis      Objective:          Vitals:     02/11/22 1251   BP: 104/70   Pulse: 84   Resp: 18   Temp: 97.3 °F (36.3 °C)   SpO2: 97%   Weight: 89.8 kg (198 lb)   Height: 5' 10\" (1.778 m)         General: no acute distress, nontoxic in appearance. Head: Normocephalic, atraumatic  Mouth: Clear, no overt lesions, oral mucosa is pink and moist.  Neck: Supple, no masses, no adenopathy or carotid bruits, trachea midline  Resp: Clear to auscultation bilaterally, no wheezing, rhonchi, or rales, excursions normal and symmetrical.  Cardio: Regular rate and rhythm, no murmurs, clicks, gallops, or rubs. Abdomen: soft, nontender, nondistended, normoactive bowel sounds, no hernias.   Extremities: Warm, well perfused, no tenderness or swelling, normal gait/station, without edema or varicosities  Neuro: Sensation and strength grossly intact and symmetrical.  Psych: Alert and oriented to person, place, and time.     CBC, CMP January 20, 2022 alkaline phosphatase 229, total bilirubin 1.5, white blood cell count 14.1  February 8, 2020 EGDunremarkable  February 8, 2022 abdominal CT 1.1 cm although wall thickening with pericholecystic inflammatory changes with the potential for both acute/chronic emphysematous cholecystitis        Assessment:      Subacute/chronic calculus cholecystitis        Plan:      Avoid fatty foods  Complete antibiotic regimen  We will schedule laparoscopic open cholecystectomy with intraoperative cholangiography in approximately 1 month after the inflammatory changes have resolved as an outpatient at [address] [place], [place] [address]il By: Lachelle Santizo DO      February 11, 2022            The above history physical examination was reviewed. The proposed laparoscopic tensely open cholecystectomy with intraoperative cholangiography was again reviewed. The risk benefits of operating versus not potential terms of potential complications up to including death were discussed.   Patient notes his understanding discussion wishes to proceed    Neel Dillon DO, FACS

## 2022-03-08 NOTE — OP NOTES
[address] [place] [address]   OPERATIVE REPORT    Name:  Ibis Willingham  MR#:   [id number]  :  1955  ACCOUNT #:  [de-identified]  DATE OF SERVICE:  2022    PREOPERATIVE DIAGNOSIS:  Subacute/chronic calculous cholecystitis. POSTOPERATIVE DIAGNOSIS:  Acute, subacute and chronic calculous cholecystitis. PROCEDURES PERFORMED:  1. Laparoscopic cholecystectomy with intraoperative cholangiography. 2.  Laparoscopic repair of choledochotomy. SURGEON:  Erma Essex, DO    FIRST ASSISTANT:  Veronica Edwards SA    ANESTHESIA:  General endotracheal supplemented at the conclusion of the operative procedure with local infiltration of the operative sites utilizing 266 mg of Exparel diluted in 50 mL of normal saline solution. COMPLICATIONS: Choledochotomy    SPECIMENS REMOVED:  Gallbladder and contents. IMPLANTS: None    ESTIMATED BLOOD LOSS:  Approximately 25 mL. OPERATIVE FINDINGS:  The patient had extensive acute, subacute and chronic inflammatory changes involving the gallbladder and triangle of Calot and hepatoduodenal ligament. The tissue surrounding the gallbladder was acutely edematous, and densely fibrotic with a markedly thickened gallbladder wall of approximately 5 mm. As a result of the essentially obliterated anatomy secondary to the extensive fibrotic response, dissection initially identified what was felt to be the cystic duct and a cystic ductotomy was performed as well as a cholangiogram noting that this naturally was the common bile duct. The common bile duct was repaired and further dissection without clear delineation of the cystic duct with a completion cholangiogram demonstrating contrast entering the duodenum with a narrowed area in the region of the repair.     INDICATIONS FOR SURGICAL PROCEDURE:  This is a 68-year-old white male with approximately a 15-year history of biliary pain which greatly intensified over the last few months, and his initial presentation at the time he was seen in our office on 02/11/2022 was notable for not only the presence of his chronic calculus cholecystitis but with an acute overlay which was demonstrated on CT. The patient underwent a regimen of antibiotics and his surgery was scheduled for a month post completion and after hopeful resolution of the inflammatory changes. The options were discussed with the patient and the patient elected to proceed with definitive surgical intervention of laparoscopic, potentially open technique. The risks and benefits of operative versus nonoperative potential alternatives and potential complications to include, but not limited to, undesired cosmetic result; conversion to open procedure; wound hematoma, seroma or infection; incisional hernia; chronic abdominal wall pain; injury to any intra-abdominal or retroperitoneal structure; biliary ductal injury; retained biliary stones; postoperative pancreatitis; DVT; PE; pneumonia; myocardial infarction; stroke; and potentially death were all discussed in detail with the patient prior to the surgical procedure, who voiced his understanding and wished to proceed. PROCEDURE:  The patient received Ancef for antibiotic prophylaxis and Lovenox for DVT prophylaxis in the preoperative staging area. After voiding and then signing his operative permit, which was properly witnessed, he was then transported to the operating room where he was placed in supine position on the operating table and SCDs were placed and inflated prior to the induction of general endotracheal anesthesia. The abdomen was then prepped and draped in the usual sterile fashion. Time-out procedure was performed according to protocol and agreed upon by all personnel in the operating suite. A transverse 12 mm cutaneous incision was then made just superior to the umbilicus through which a 12-mm Optiview trocar and cannula were placed in the peritoneal cavity under direct vision utilizing a 10-mm 0-degree laparoscope. The laparoscope and trocar were removed. The abdomen was insufflated with carbon dioxide gas, never exceeding a pressure of 15 mmHg, and a 30-degree 10-mm laparoscope was placed and utilized for the remainder of the procedure. The remaining ports were then placed through transverse cutaneous incisions utilizing Optiview trocars and cannulas under direct vision, the second a 12 mm incision one-third the distance from the xiphisternal junction to the umbilicus in the midline and two 5 mm incisions two fingerbreadths below the right costal margin, the first in the right midclavicular and the second in the right anterior axillary line. Appropriately sized Optiview trocars and cannulas were placed under direct vision. Visualization of the upper abdomen was notable for omental adhesions to the area in which one would expect to locate the gallbladder. These were taken down utilizing electrocautery until the fundus which demonstrated the gallbladder was noted to be white, fibrotic but with acute edematous changes with a markedly thickened gallbladder wall. The fundus was grasped and retracted superiorly. The remainder of the omental adhesions were taken down. The area surrounding the gallbladder as well as the gallbladder was involved with exceptionally dense fibrotic tissue with acute edematous changes during dissection of the omentum as well as the duodenum off the gallbladder. Perforation was made in the gallbladder and it was noted to be filled with approximately 20 mL of purulent material.  After taking the omental adhesions down in the duodenum off the infundibulum, the infundibulum was grasped and retracted in an inferolateral direction. Meticulous dissection was pursued which was very difficult because of the fibrotic nature of the surrounding tissue.   Eventually, what was felt to be the cystic duct was isolated and a 14-Persian angiocatheter introduced percutaneously into the peritoneal cavity through which a 3-Welsh ureteral catheter was placed into the peritoneal cavity and what was felt to be the cystic duct, a 2-mm ductotomy was made. The ureteral catheter was placed within the duct and held in position utilizing a retaining clip. Intraoperative cholangiography was then pursued utilizing full-strength Conray 60, a total of 5 mL. The common duct was visualized with free spillage into the duodenum and no filling defect as was the cystic duct junction with the common duct, however, there was no proximal filling. The retaining clip for the ureteral catheter was removed and the common ductotomy was repaired utilizing simple interrupted 4-0 Monocryl sutures. Dissection was then continued proximally and again after a very difficult dissection because of the fibrotic nature, the cystic duct was definitely identified, it was clipped proximally as was the cystic artery which was clipped both proximally and distally, and a cystic ductotomy was performed. The 3-Welsh ureteral catheter was introduced into the cystic duct and held in position with retaining clip. Cholangiography was again pursued and it was notable for again demonstration of the cystic duct junction in the common hepatic  duct, left and right hepatic duct, common hepatic duct without filling defect as was the distal common hepatic duct and there was free spillage of contrast in the duodenum. The area of repair did have a narrowed segment, but the repair was performed with, as mentioned, simple interrupted 4-0 Monocryl sutures, involved less than a quarter of the circumference of the duct, was approximately 2-3 mm in length and was closed transversely. The retaining clip, ureteral catheter, and angiocatheter were removed. The cystic duct was doubly clipped proximally and transected.   The previously clipped cystic artery was transected and the gallbladder was dissected from the hepatic fossa utilizing electrocautery, placed in an Endopouch, and removed through the epigastric port site. A 19-Chilean round Hemovac drain was then placed in the peritoneal cavity and exited through the midclavicular 5 mm incision. It was placed through the foramen of Lancing underneath the liver and after its exit was anchored to the skin with a 2-0 nylon suture, after removing the gallbladder and contents within the Endopouch through the epigastric port site, fascial closure was accomplished with a #2 Vicryl suture. Laparoscopic was then shifted to the epigastric port site to allow closure of supraumbilical fascial trocar defect with a #2 Vicryl suture and a suture passing device. All operative sites were inspected and noted to be hemostatic. The abdomen was desufflated of carbon dioxide gas. Fascial sutures were tied. The drain was connected to bulb suction. The wounds were irrigated with normal saline solution. Closure of the skin was accomplished with a series of simple interrupted buried deep dermal 4-0 Monocryl sutures. The incisions were infiltrated with 266 mg of Exparel diluted in 50 mL of normal saline solution. Steri-Strips were applied as were sterile dressings. The sponge and needle count was correct both during and at the completion of the procedure. The patient tolerated the procedure without operative complications and subsequently was extubated and transported to the recovery room in awake, alert, and stable condition. The patient received 2 liters of crystalloid during the procedure. Operative start time was 0759, completion time 0933.       Bridgette Interiano DO      DS/[id number]  D:  03/08/2022 12:44  T:  03/08/2022 18:26  JOB #:  [id number]

## 2022-03-08 NOTE — BRIEF OP NOTE
Brief Postoperative Note    Patient: Jeramie Hoffman  YOB: 1955  MRN: [id number]    Date of Procedure: 3/8/2022     Pre-Op Diagnosis: 1. Acute, subacute chronic cholecystitis [K81.9]    Post-Op Diagnosis: Same as preoperative diagnosis. Procedure(s):  LAPAROSCOPIC CHOLECYSTECTOMY WITH POSSIBLE CHOLANGIOGRAM/C-ARM    Surgeon(s):  Jo Ann Mathews DO    Surgical Assistant: Surg Asst-1: Veronica Edwards    Anesthesia: General     Estimated Blood Loss (mL): Normal    Complications[de-identified] Choledochotomy    Specimens:   ID Type Source Tests Collected by Time Destination   1 : Gallbladder and Contents Preservative   Jo Ann Mathews DO 3/8/2022 4169 Pathology        Implants: * No implants in log *    Drains:   Neal-Bolton Drain 03/08/22 Left; Lower Abdomen (Active)       Findings: Extensive acute subacute and chronic inflammatory changes involve the gallbladder the gallbladder was intensely fibrotic contains gallbladder wall thickening proximal 5 mm with obliterated anatomy secondary to the fibrosis resulting in the initial duplication of what was felt with the cystic duct and the common bile duct identified X-rated choledochotomy. Subsequent to this identification the common duct was repaired and a posterior repair of cholangiogram demonstrated the cystic cystic junction with common bile duct, the hepatic duct and left right hepatic ducts without evidence of filling defect. With the contrast from the common bile duct or duodenum. There was however narrowed area in the region of the repair.     Electronically Signed by Sheba Steiner DO on 3/8/2022 at 9:46 AM

## 2022-03-08 NOTE — PROGRESS NOTES
conducted a pre-surgery visit with Kristie Guzman, who is a 61 y.o.,male. The  provided the following Interventions:  Initiated a relationship of care and support. Offered prayer and assurance of continued prayers on patient's behalf. There is no advance directive present. Plan:  Chaplains will continue to follow and will provide pastoral care on an as needed/requested basis.  recommends bedside caregivers page  on duty if patient shows signs of acute spiritual or emotional distress.     Reiseñor 3   Board Certified [address] [place] [address]   [phone]

## 2022-03-09 DIAGNOSIS — Z90.49 STATUS POST LAPAROSCOPIC CHOLECYSTECTOMY: ICD-10-CM

## 2022-03-09 DIAGNOSIS — Z90.49 STATUS POST LAPAROSCOPIC CHOLECYSTECTOMY: Primary | ICD-10-CM

## 2022-03-15 ENCOUNTER — OFFICE VISIT (OUTPATIENT)
Dept: SURGERY | Age: 67
End: 2022-03-15
Payer: COMMERCIAL

## 2022-03-15 ENCOUNTER — HOSPITAL ENCOUNTER (OUTPATIENT)
Dept: LAB | Age: 67
Discharge: HOME | End: 2022-03-15

## 2022-03-15 VITALS
RESPIRATION RATE: 18 BRPM | TEMPERATURE: 98.8 F | WEIGHT: 208 LBS | OXYGEN SATURATION: 98 % | DIASTOLIC BLOOD PRESSURE: 78 MMHG | SYSTOLIC BLOOD PRESSURE: 140 MMHG | BODY MASS INDEX: 29.78 KG/M2 | HEIGHT: 70 IN | HEART RATE: 110 BPM

## 2022-03-15 DIAGNOSIS — Z90.49 STATUS POST LAPAROSCOPIC CHOLECYSTECTOMY: Primary | ICD-10-CM

## 2022-03-15 LAB — SENTARA SPECIMEN COL,SENBCF: NORMAL

## 2022-03-15 PROCEDURE — 99001 SPECIMEN HANDLING PT-LAB: CPT

## 2022-03-15 PROCEDURE — 99024 POSTOP FOLLOW-UP VISIT: CPT | Performed by: SURGERY

## 2022-03-15 NOTE — PROGRESS NOTES
Chief Complaint   Patient presents with    Post OP Follow Up     Laparoscopic cholecystectomy with intraoperative cholangiography. 1. Have you been to the ER, urgent care clinic since your last visit? Hospitalized since your last visit? no    2. Have you seen or consulted any other health care providers outside of the [address] [place] [address] since your last visit? Include any pap smears or colon screening.  No      Complaints of abd pain 7 out of ten  and nausea bowels have moved

## 2022-03-16 LAB
A-G RATIO,AGRAT: 1.6 RATIO (ref 1.1–2.6)
ALBUMIN SERPL-MCNC: 3.9 G/DL (ref 3.5–5)
ALP SERPL-CCNC: 271 U/L (ref 40–125)
ALT SERPL-CCNC: 60 U/L (ref 5–40)
ANION GAP SERPL CALC-SCNC: 11 MMOL/L (ref 3–15)
AST SERPL W P-5'-P-CCNC: 50 U/L (ref 10–37)
BILIRUB SERPL-MCNC: 0.6 MG/DL (ref 0.2–1.2)
BUN SERPL-MCNC: 11 MG/DL (ref 6–22)
CALCIUM SERPL-MCNC: 9.7 MG/DL (ref 8.4–10.5)
CHLORIDE SERPL-SCNC: 100 MMOL/L (ref 98–110)
CO2 SERPL-SCNC: 26 MMOL/L (ref 20–32)
CREAT SERPL-MCNC: 0.8 MG/DL (ref 0.8–1.6)
GFRAA, 66117: >60
GFRNA, 66118: >60
GLOBULIN,GLOB: 2.5 G/DL (ref 2–4)
GLUCOSE SERPL-MCNC: 86 MG/DL (ref 70–99)
POTASSIUM SERPL-SCNC: 4.6 MMOL/L (ref 3.5–5.5)
PROT SERPL-MCNC: 6.4 G/DL (ref 6.2–8.1)
SODIUM SERPL-SCNC: 137 MMOL/L (ref 133–145)

## 2022-03-19 PROBLEM — K80.10 CHRONIC CHOLECYSTITIS WITH CALCULUS: Status: ACTIVE | Noted: 2022-03-08

## 2022-03-24 ENCOUNTER — TELEPHONE (OUTPATIENT)
Dept: SURGERY | Age: 67
End: 2022-03-24

## 2022-03-24 NOTE — TELEPHONE ENCOUNTER
Pt called and left message that he was feeling good and not having any issues wants to know if it ok to not keep next appt

## 2022-04-14 NOTE — PROGRESS NOTES
Surgical Follow-Up Evaluation    Kareem Penn is a 61 y.o. white male status post laparoscopic cholecystectomy with intraoperative cholangiography March 8, 1975 complicated by severe inflammatory changes with initial attempt at cholangiography via the common hepatic duct with subsequent repair. The patient notes appropriate periincisional tenderness and is otherwise without complaint longer require analgesics. The patient is tolerating a regular diet with normal bowel and bladder habits and has returned normal activity excluding heavy lifting. Patient denies local or systemic signs of infectious complications. The Neal-Bolton output is less than 15 cc on a daily basis          Allergies   Allergen Reactions    Ampicillin Diarrhea    Codeine Nausea Only       Current Outpatient Medications on File Prior to Visit   Medication Sig Dispense Refill    ibuprofen (MOTRIN) 800 mg tablet Take 1 Tablet by mouth every eight (8) hours as needed for Pain. 30 Tablet 1    sertraline (ZOLOFT) 100 mg tablet Take 100 mg by mouth daily.  tamsulosin (FLOMAX) 0.4 mg capsule Take 1 Cap by mouth daily (after dinner). 90 Cap 3    multivitamin (ONE A DAY) tablet Take 1 Tab by mouth daily.  esomeprazole (NEXIUM) 40 mg capsule Take 40 mg by mouth two (2) times a day.  pregabalin (LYRICA) 25 mg capsule Take 25 mg by mouth two (2) times a day. (Patient not taking: Reported on 3/8/2022)      celecoxib (CELEBREX) 100 mg capsule Take  by mouth two (2) times a day. (Patient not taking: Reported on 2/11/2022)       No current facility-administered medications on file prior to visit.        Past Medical History:   Diagnosis Date    Acid reflux     Arthritis     Basal cell carcinoma of skin     Burning with urination     Chronic pain     in hands    Elevated PSA     Erectile dysfunction     Hemorrhoids     Hodgkin's lymphoma (HCC)     Lower urinary tract symptoms (LUTS)        Past Surgical History:   Procedure Laterality Date    HX CARPAL TUNNEL RELEASE      HX CHOLECYSTECTOMY      HX HERNIA REPAIR      right inguinal repair    HX ORTHOPAEDIC      HX SHOULDER REPLACEMENT  04/28/2010    right    HX UROLOGICAL  07/11/2019    pnbx-trus vol 75.2cc. path benign. Dr. Marleni Huang. Social History     Tobacco Use    Smoking status: Never Smoker    Smokeless tobacco: Never Used   Substance Use Topics    Alcohol use: Not Currently     Alcohol/week: 6.0 - 7.0 standard drinks     Types: 6 - 7 Cans of beer per week     Comment: weekly    Drug use: No       Family History   Problem Relation Age of Onset    Hypertension Mother     Diabetes Maternal Grandfather        ROS:  General: Negative for fevers, chills, night sweats, fatigue, weight loss, or weight gain. GI: Negative for abdominal pain, change in bowel habits, hematochezia, melena, or GERD. Integumentary: Negative for dermatitis or abnormal moles. HEENT: Negative for visual changes, vertigo, epistaxis, dysphagia, or hoarseness    Cardiac: Negative for chest pain, palpitations, hypertension, edema, or varicosities    Resp: Negative for cough, shortness of breath, wheezing, hemoptysis, snoring, or reactive airway disease    : Negative for hematuria, dysuria, frequency, urgency, nocturia, or stress urinary incontinence    MSK:  Negative for weakness, joint pain, or arthritis    Endocrine: Negative for diabetes, thyroid disease, polyuria, polydipsia, polyphagia, poor wound, heat intolerance, or cold intolerance. Lymph/Heme: Negative for anemia, bruising, or history of blood transfusions. Neuro:  Negative for dizziness, headache, fainting, seizures, and stroke. Psychiatry:  Negative for anxiety or depression    Physical Exam    Visit Vitals  BP (!) 140/78   Pulse (!) 110   Temp 98.8 °F (37.1 °C)   Resp 18   Ht 5' 10\" (1.778 m)   Wt 94.3 kg (208 lb)   SpO2 98%   BMI 29.84 kg/m²       Nursing note reviewed.      General: 61 y.o. male is in no acute distress. Head: Normocephalic, atraumatic  Resp: Clear to auscultation bilaterally, no wheezing, rhonchi, or rales, excursions normal and symmetrical.  Cardio: Regular rate and rhythm, no murmurs, clicks, gallops, or rubs. No edema or varicosities. Abdomen: Obese, soft, nontender, nondistended, normoactive bowel sounds, wounds clean dry approximated appropriate strain induration incisional tenderness. There is minimal serous output from the Neal-Bolton drain  Psych: Alert and oriented to person, place, and time.     March 8, 2022 pathology/gallbladder: Severe calculus cholecystitis    Impression    Status post laparoscopic cholecystectomy with intraoperative angiography complicated by initial attempt at cholangiography via the common bile duct with subsequent repairdoing well with complete resolution of preoperative symptomatology      Plan    DC Neal-Bolton drain  Continue pursuit of a healthy regular diet with continuation of analgesics on as-needed basis, refrain from heavy lifting until 1 month following the surgical procedure with advancement as tolerated thereafter  Follow-up 1 year with CMP

## 2022-04-29 NOTE — TELEPHONE ENCOUNTER
Fax sent to Dr. Roger Rodríguez and confirmation received.
Patient called stating he is feeling better and wanted to know if he still needed to come in. Per Dr. Ann Villafana no need for a follow up appointment. Advised patient per Dr. Ann Villafana. Patient stated understanding. Patient would also like his notes sent to Dr. Thierno Bates office.
Oriented - self; Oriented - place; Oriented - time

## 2022-08-18 RX ORDER — IBUPROFEN 800 MG/1
TABLET ORAL
Qty: 30 TABLET | Refills: 1 | OUTPATIENT
Start: 2022-08-18

## 2023-03-09 ENCOUNTER — OFFICE VISIT (OUTPATIENT)
Age: 68
End: 2023-03-09
Payer: MEDICARE

## 2023-03-09 VITALS
SYSTOLIC BLOOD PRESSURE: 116 MMHG | DIASTOLIC BLOOD PRESSURE: 72 MMHG | OXYGEN SATURATION: 99 % | HEIGHT: 70 IN | TEMPERATURE: 97.2 F | RESPIRATION RATE: 16 BRPM | HEART RATE: 105 BPM | BODY MASS INDEX: 33.64 KG/M2 | WEIGHT: 235 LBS

## 2023-03-09 DIAGNOSIS — K43.2 INCISIONAL HERNIA, WITHOUT OBSTRUCTION OR GANGRENE: Primary | ICD-10-CM

## 2023-03-09 DIAGNOSIS — E66.9 OBESITY (BMI 30-39.9): ICD-10-CM

## 2023-03-09 PROCEDURE — 1036F TOBACCO NON-USER: CPT | Performed by: SURGERY

## 2023-03-09 PROCEDURE — G8484 FLU IMMUNIZE NO ADMIN: HCPCS | Performed by: SURGERY

## 2023-03-09 PROCEDURE — G8417 CALC BMI ABV UP PARAM F/U: HCPCS | Performed by: SURGERY

## 2023-03-09 PROCEDURE — 1123F ACP DISCUSS/DSCN MKR DOCD: CPT | Performed by: SURGERY

## 2023-03-09 PROCEDURE — G8427 DOCREV CUR MEDS BY ELIG CLIN: HCPCS | Performed by: SURGERY

## 2023-03-09 PROCEDURE — 99204 OFFICE O/P NEW MOD 45 MIN: CPT | Performed by: SURGERY

## 2023-03-09 PROCEDURE — 3017F COLORECTAL CA SCREEN DOC REV: CPT | Performed by: SURGERY

## 2023-03-09 NOTE — PROGRESS NOTES
Graciela Fuchs is a 74 y.o. male  Chief Complaint   Patient presents with    New Patient     Incisional Umbilical hernia     Vitals:    03/09/23 1337   BP: 116/72   Pulse: (!) 105   Resp: 16   Temp: 97.2 °F (36.2 °C)   SpO2: 99%     Ambulatory Bariatric Summary 3/9/2023 3/15/2022 3/8/2022 3/8/2022 3/8/2022 3/8/2022 0/9/2200   Systolic 838 844 857 - 734 058 919   Diastolic 72 78 69 - 70 68 67   Pulse 105 110 70 60 64 62 66   Temp 97.2 - - - - - -   Resp 16 - - - - - -   Weight 235 208 - - - - -   Height 70 70 - - - - -   BMI 33.8 kg/m2 29.9 kg/m2 - - - - -     Body mass index is 33.72 kg/m².

## 2023-03-10 ASSESSMENT — ENCOUNTER SYMPTOMS
CHEST TIGHTNESS: 0
ABDOMINAL PAIN: 1
NAUSEA: 1
ABDOMINAL DISTENTION: 0
COUGH: 1
CONSTIPATION: 0
VOMITING: 0

## 2023-03-10 NOTE — PROGRESS NOTES
CC:   Chief Complaint   Patient presents with    New Patient     Incisional Umbilical hernia        Assessment:    ICD-10-CM    1. Incisional hernia, without obstruction or gangrene  K43.2 SCHEDULE SURGERY      2. Obesity (BMI 30-39. 9)  E66.9           Plan: I recommended open incisional hernia repair with mesh. The risks and benefits of the procedure were reviewed with the patient including infection, bleeding, need for repeat procedure, injury to surrounding structures, recurrent hernia, chronic pain. Questions were answered and consent was obtained. HPI:  Harpreet Woods is a 74 y.o. male who is here today for umbilical hernia. He underwent laparoscopic cholecystectomy with Dr. Melia Nageotte 3/2022 and shortly there after had a coughing episode and developed a hernia at the incision site. He feels the area has not changed in size but does cause some occasional nausea- no vomiting. He does tend to have a dry cough at night. He also had open repair of incarcerated umbilical hernia in 7463 and believes mesh was used. He did well with this surgery. Allergies: Allergies   Allergen Reactions    Ampicillin Diarrhea    Codeine Nausea Only       Medication Review:  Current Outpatient Medications on File Prior to Visit   Medication Sig Dispense Refill    esomeprazole (NEXIUM) 40 MG delayed release capsule Take 40 mg by mouth 2 times daily      ibuprofen (ADVIL;MOTRIN) 800 MG tablet Take 800 mg by mouth every 8 hours as needed      sertraline (ZOLOFT) 100 MG tablet Take 100 mg by mouth daily      tamsulosin (FLOMAX) 0.4 MG capsule Take 0.4 mg by mouth      celecoxib (CELEBREX) 100 MG capsule Take by mouth 2 times daily (Patient not taking: Reported on 3/9/2023)      pregabalin (LYRICA) 25 MG capsule Take 25 mg by mouth 2 times daily. (Patient not taking: Reported on 3/9/2023)       No current facility-administered medications on file prior to visit.        Systems Review:  Review of Systems   Constitutional:  Negative for fatigue, fever and unexpected weight change. Respiratory:  Positive for cough. Negative for chest tightness. Cardiovascular:  Negative for chest pain and palpitations. Gastrointestinal:  Positive for abdominal pain and nausea. Negative for abdominal distention, constipation and vomiting. Hematological:  Negative for adenopathy. Does not bruise/bleed easily. PMH:  Past Medical History:   Diagnosis Date    Acid reflux     Arthritis     Basal cell carcinoma of skin     Burning with urination     Chronic pain     in hands    Elevated PSA     Erectile dysfunction     Hemorrhoids     Hodgkin's lymphoma ([address]     Lower urinary tract symptoms (LUTS)        Surgical History:  Past Surgical History:   Procedure Laterality Date    CARPAL TUNNEL RELEASE      CHOLECYSTECTOMY      HERNIA REPAIR      right inguinal repair    ORTHOPEDIC SURGERY      SHOULDER ARTHROPLASTY  04/28/2010    right    UROLOGICAL SURGERY  07/11/2019    pnbx-trus vol 75.2cc. path benign. Dr. Yohana Nieves. Social History:  Social History     Socioeconomic History    Marital status:      Spouse name: None    Number of children: None    Years of education: None    Highest education level: None   Tobacco Use    Smoking status: Never    Smokeless tobacco: Never   Substance and Sexual Activity    Alcohol use: Not Currently     Alcohol/week: 6.0 - 7.0 standard drinks    Drug use: No       Family History:  Family History   Problem Relation Age of Onset    Hypertension Mother     Diabetes Maternal Grandfather        No visits with results within 3 Month(s) from this visit.    Latest known visit with results is:   Orders Only on 03/15/2022   Component Date Value Ref Range Status    Glucose 03/15/2022 86  70 - 99 mg/dL Final    BUN 03/15/2022 11  6 - 22 mg/dL Final    Creatinine 03/15/2022 0.8  0.8 - 1.6 mg/dL Final    Sodium 03/15/2022 137  133 - 145 mmol/L Final    Potassium 03/15/2022 4.6  3.5 - 5.5 mmol/L Final    Chloride 03/15/2022 100 98 - 110 mmol/L Final    CO2 03/15/2022 26  20 - 32 mmol/L Final    AST 03/15/2022 50 (A)  10 - 37 U/L Final    ALT 03/15/2022 60 (A)  5 - 40 U/L Final    Alkaline Phosphatase 03/15/2022 271 (A)  40 - 125 U/L Final    Total Bilirubin 03/15/2022 0.6  0.2 - 1.2 mg/dL Final    Calcium 03/15/2022 9.7  8.4 - 10.5 mg/dL Final    Total Protein 03/15/2022 6.4  6.2 - 8.1 g/dL Final    Albumin 03/15/2022 3.9  3.5 - 5.0 g/dL Final    Albumin/Globulin Ratio 03/15/2022 1.6  1.1 - 2.6 ratio Final    Globulin 03/15/2022 2.5  2.0 - 4.0 g/dL Final    Anion Gap 03/15/2022 11.0  3.0 - 15.0 mmol/L Final    Comment: Anion Gap calculation based on electrolyte reference ranges. Test includes Albumin, Alkaline Phosphatase, ALT, AST, BUN, Calcium, CO2,  Chloride, Creatinine, Glucose, Potassium, Sodium, Total Bilirubin and Total  Protein. Estimated GFR results are reported in mL/min/1.73 sq.m. by the MDRD equation. This eGFR is validated for stable chronic renal failure patients. This   equation  is unreliable in acute illness or patients with normal renal function. GFR  03/15/2022 >60.0  >60.0 Final    GFR Non- 03/15/2022 >60.0  >60.0 Final       FL CHOLANGIOGRAM OR  Narrative: Intraoperative cholangiogram    HISTORY: Cholecystitis. Cholecystectomy. Fluoroscopy time: 56 seconds  # of images: 1    Proximal portion of the common bile duct is not well filled with contrast.  Remaining most of the common bile duct shows normal caliber. No filling defect. Contrast seen in the duodenum with reflux into the pancreatic duct. Impression: As described. Correlate with procedure note needed. Physical Exam:  /72 (Site: Left Upper Arm, Position: Sitting)   Pulse (!) 105   Temp 97.2 °F (36.2 °C) (Temporal)   Resp 16   Ht 5' 10\" (1.778 m)   Wt 235 lb (106.6 kg)   SpO2 99%   BMI 33.72 kg/m²  BMI: Body mass index is 33.72 kg/m².     Physical Exam  Constitutional:       Appearance: Normal appearance. HENT:      Head: Normocephalic and atraumatic. Eyes:      Extraocular Movements: Extraocular movements intact. Conjunctiva/sclera: Conjunctivae normal.      Pupils: Pupils are equal, round, and reactive to light. Cardiovascular:      Rate and Rhythm: Normal rate. Pulmonary:      Effort: Pulmonary effort is normal.   Abdominal:      General: There is no distension. Palpations: Abdomen is soft. Tenderness: There is abdominal tenderness. Hernia: A hernia is present. Comments: Incarcerated incisional hernia superior to umbilicus, tender to attempts at reduction, 2.0cm on exam   Neurological:      Mental Status: He is alert. Psychiatric:         Mood and Affect: Mood normal.         Behavior: Behavior normal.         Thought Content: Thought content normal.         Judgment: Judgment normal.       I have reviewed the information entered by the clinical staff and/or patient and verified it as accurate or edited where necessary.      Electronically signed by:    Ketan Johnston DO, MPH

## 2023-04-18 RX ORDER — M-VIT,TX,IRON,MINS/CALC/FOLIC 27MG-0.4MG
1 TABLET ORAL DAILY
COMMUNITY

## 2023-04-18 RX ORDER — SULINDAC 200 MG/1
200 TABLET ORAL DAILY
COMMUNITY
Start: 2023-02-06

## 2023-04-18 RX ORDER — SERTRALINE HYDROCHLORIDE 100 MG/1
100 TABLET, FILM COATED ORAL DAILY
COMMUNITY

## 2023-04-27 ENCOUNTER — ANESTHESIA EVENT (OUTPATIENT)
Facility: HOSPITAL | Age: 68
End: 2023-04-27
Payer: MEDICARE

## 2023-04-28 ENCOUNTER — HOSPITAL ENCOUNTER (OUTPATIENT)
Facility: HOSPITAL | Age: 68
Setting detail: OUTPATIENT SURGERY
Discharge: HOME | End: 2023-04-28
Attending: SURGERY | Admitting: SURGERY
Payer: MEDICARE

## 2023-04-28 ENCOUNTER — ANESTHESIA (OUTPATIENT)
Facility: HOSPITAL | Age: 68
End: 2023-04-28
Payer: MEDICARE

## 2023-04-28 VITALS
BODY MASS INDEX: 32.56 KG/M2 | TEMPERATURE: 97.8 F | WEIGHT: 227.4 LBS | OXYGEN SATURATION: 95 % | HEIGHT: 70 IN | HEART RATE: 87 BPM | DIASTOLIC BLOOD PRESSURE: 64 MMHG | SYSTOLIC BLOOD PRESSURE: 150 MMHG | RESPIRATION RATE: 16 BRPM

## 2023-04-28 DIAGNOSIS — Z98.890 S/P HERNIA REPAIR: Primary | ICD-10-CM

## 2023-04-28 DIAGNOSIS — Z87.19 S/P HERNIA REPAIR: Primary | ICD-10-CM

## 2023-04-28 LAB — GLUCOSE BLD STRIP.AUTO-MCNC: 103 MG/DL (ref 70–110)

## 2023-04-28 PROCEDURE — 3600000012 HC SURGERY LEVEL 2 ADDTL 15MIN: Performed by: SURGERY

## 2023-04-28 PROCEDURE — 2580000003 HC RX 258: Performed by: NURSE ANESTHETIST, CERTIFIED REGISTERED

## 2023-04-28 PROCEDURE — 3600000002 HC SURGERY LEVEL 2 BASE: Performed by: SURGERY

## 2023-04-28 PROCEDURE — 7100000010 HC PHASE II RECOVERY - FIRST 15 MIN: Performed by: SURGERY

## 2023-04-28 PROCEDURE — 2580000003 HC RX 258: Performed by: SURGERY

## 2023-04-28 PROCEDURE — 49594 RPR AA HRN 1ST 3-10 NCR/STRN: CPT | Performed by: SURGERY

## 2023-04-28 PROCEDURE — 82962 GLUCOSE BLOOD TEST: CPT

## 2023-04-28 PROCEDURE — 2709999900 HC NON-CHARGEABLE SUPPLY: Performed by: SURGERY

## 2023-04-28 PROCEDURE — 2500000003 HC RX 250 WO HCPCS: Performed by: NURSE ANESTHETIST, CERTIFIED REGISTERED

## 2023-04-28 PROCEDURE — 3700000001 HC ADD 15 MINUTES (ANESTHESIA): Performed by: SURGERY

## 2023-04-28 PROCEDURE — 6370000000 HC RX 637 (ALT 250 FOR IP): Performed by: NURSE ANESTHETIST, CERTIFIED REGISTERED

## 2023-04-28 PROCEDURE — 6360000002 HC RX W HCPCS: Performed by: NURSE ANESTHETIST, CERTIFIED REGISTERED

## 2023-04-28 PROCEDURE — 7100000011 HC PHASE II RECOVERY - ADDTL 15 MIN: Performed by: SURGERY

## 2023-04-28 PROCEDURE — C1781 MESH (IMPLANTABLE): HCPCS | Performed by: SURGERY

## 2023-04-28 PROCEDURE — 7100000000 HC PACU RECOVERY - FIRST 15 MIN: Performed by: SURGERY

## 2023-04-28 PROCEDURE — 3700000000 HC ANESTHESIA ATTENDED CARE: Performed by: SURGERY

## 2023-04-28 PROCEDURE — 6360000002 HC RX W HCPCS: Performed by: SURGERY

## 2023-04-28 PROCEDURE — 2500000003 HC RX 250 WO HCPCS: Performed by: SURGERY

## 2023-04-28 PROCEDURE — 7100000001 HC PACU RECOVERY - ADDTL 15 MIN: Performed by: SURGERY

## 2023-04-28 DEVICE — PATCH HERN M DIA2.5IN CIR W/ STRP SEPRA TECHNOLOGY ABSRB: Type: IMPLANTABLE DEVICE | Site: UMBILICAL | Status: FUNCTIONAL

## 2023-04-28 RX ORDER — FENTANYL CITRATE 50 UG/ML
INJECTION, SOLUTION INTRAMUSCULAR; INTRAVENOUS PRN
Status: DISCONTINUED | OUTPATIENT
Start: 2023-04-28 | End: 2023-04-28 | Stop reason: SDUPTHER

## 2023-04-28 RX ORDER — DIPHENHYDRAMINE HYDROCHLORIDE 50 MG/ML
12.5 INJECTION INTRAMUSCULAR; INTRAVENOUS
Status: DISCONTINUED | OUTPATIENT
Start: 2023-04-28 | End: 2023-04-28 | Stop reason: HOSPADM

## 2023-04-28 RX ORDER — SODIUM CHLORIDE 0.9 % (FLUSH) 0.9 %
5-40 SYRINGE (ML) INJECTION PRN
Status: DISCONTINUED | OUTPATIENT
Start: 2023-04-28 | End: 2023-04-28 | Stop reason: HOSPADM

## 2023-04-28 RX ORDER — PROPOFOL 10 MG/ML
INJECTION, EMULSION INTRAVENOUS PRN
Status: DISCONTINUED | OUTPATIENT
Start: 2023-04-28 | End: 2023-04-28 | Stop reason: SDUPTHER

## 2023-04-28 RX ORDER — SODIUM CHLORIDE, SODIUM LACTATE, POTASSIUM CHLORIDE, CALCIUM CHLORIDE 600; 310; 30; 20 MG/100ML; MG/100ML; MG/100ML; MG/100ML
INJECTION, SOLUTION INTRAVENOUS CONTINUOUS
Status: DISCONTINUED | OUTPATIENT
Start: 2023-04-28 | End: 2023-04-28 | Stop reason: HOSPADM

## 2023-04-28 RX ORDER — SODIUM CHLORIDE 0.9 % (FLUSH) 0.9 %
5-40 SYRINGE (ML) INJECTION EVERY 12 HOURS SCHEDULED
Status: DISCONTINUED | OUTPATIENT
Start: 2023-04-28 | End: 2023-04-28 | Stop reason: HOSPADM

## 2023-04-28 RX ORDER — DEXTROSE MONOHYDRATE 100 MG/ML
INJECTION, SOLUTION INTRAVENOUS CONTINUOUS PRN
Status: DISCONTINUED | OUTPATIENT
Start: 2023-04-28 | End: 2023-04-28 | Stop reason: HOSPADM

## 2023-04-28 RX ORDER — FAMOTIDINE 20 MG/1
20 TABLET, FILM COATED ORAL ONCE
Status: COMPLETED | OUTPATIENT
Start: 2023-04-28 | End: 2023-04-28

## 2023-04-28 RX ORDER — HYDROCODONE BITARTRATE AND ACETAMINOPHEN 5; 325 MG/1; MG/1
1 TABLET ORAL EVERY 4 HOURS PRN
Qty: 18 TABLET | Refills: 0 | Status: SHIPPED | OUTPATIENT
Start: 2023-04-28 | End: 2023-05-01

## 2023-04-28 RX ORDER — LIDOCAINE HYDROCHLORIDE 10 MG/ML
1 INJECTION, SOLUTION EPIDURAL; INFILTRATION; INTRACAUDAL; PERINEURAL
Status: DISCONTINUED | OUTPATIENT
Start: 2023-04-28 | End: 2023-04-28 | Stop reason: HOSPADM

## 2023-04-28 RX ORDER — FENTANYL CITRATE 50 UG/ML
50 INJECTION, SOLUTION INTRAMUSCULAR; INTRAVENOUS EVERY 5 MIN PRN
Status: DISCONTINUED | OUTPATIENT
Start: 2023-04-28 | End: 2023-04-28 | Stop reason: HOSPADM

## 2023-04-28 RX ORDER — ONDANSETRON 2 MG/ML
4 INJECTION INTRAMUSCULAR; INTRAVENOUS
Status: COMPLETED | OUTPATIENT
Start: 2023-04-28 | End: 2023-04-28

## 2023-04-28 RX ORDER — ONDANSETRON 2 MG/ML
INJECTION INTRAMUSCULAR; INTRAVENOUS PRN
Status: DISCONTINUED | OUTPATIENT
Start: 2023-04-28 | End: 2023-04-28 | Stop reason: SDUPTHER

## 2023-04-28 RX ORDER — SODIUM CHLORIDE 9 MG/ML
INJECTION, SOLUTION INTRAVENOUS PRN
Status: DISCONTINUED | OUTPATIENT
Start: 2023-04-28 | End: 2023-04-28 | Stop reason: HOSPADM

## 2023-04-28 RX ORDER — LIDOCAINE HYDROCHLORIDE 20 MG/ML
INJECTION, SOLUTION EPIDURAL; INFILTRATION; INTRACAUDAL; PERINEURAL PRN
Status: DISCONTINUED | OUTPATIENT
Start: 2023-04-28 | End: 2023-04-28 | Stop reason: SDUPTHER

## 2023-04-28 RX ORDER — MIDAZOLAM HYDROCHLORIDE 1 MG/ML
INJECTION INTRAMUSCULAR; INTRAVENOUS PRN
Status: DISCONTINUED | OUTPATIENT
Start: 2023-04-28 | End: 2023-04-28 | Stop reason: SDUPTHER

## 2023-04-28 RX ADMIN — WATER 2 MG: 1 INJECTION, SOLUTION INTRAMUSCULAR; INTRAVENOUS; SUBCUTANEOUS at 08:20

## 2023-04-28 RX ADMIN — HYDROMORPHONE HYDROCHLORIDE 0.25 MG: 1 INJECTION, SOLUTION INTRAMUSCULAR; INTRAVENOUS; SUBCUTANEOUS at 09:42

## 2023-04-28 RX ADMIN — MIDAZOLAM HYDROCHLORIDE 2 MG: 2 INJECTION, SOLUTION INTRAMUSCULAR; INTRAVENOUS at 08:12

## 2023-04-28 RX ADMIN — PROPOFOL 200 MG: 10 INJECTION, EMULSION INTRAVENOUS at 08:17

## 2023-04-28 RX ADMIN — FAMOTIDINE 20 MG: 20 TABLET ORAL at 08:03

## 2023-04-28 RX ADMIN — SODIUM CHLORIDE, POTASSIUM CHLORIDE, SODIUM LACTATE AND CALCIUM CHLORIDE: 600; 310; 30; 20 INJECTION, SOLUTION INTRAVENOUS at 08:02

## 2023-04-28 RX ADMIN — LIDOCAINE HYDROCHLORIDE 100 MG: 20 INJECTION, SOLUTION EPIDURAL; INFILTRATION; INTRACAUDAL; PERINEURAL at 08:17

## 2023-04-28 RX ADMIN — ONDANSETRON 4 MG: 2 INJECTION INTRAMUSCULAR; INTRAVENOUS at 09:44

## 2023-04-28 RX ADMIN — ONDANSETRON 4 MG: 2 INJECTION INTRAMUSCULAR; INTRAVENOUS at 08:35

## 2023-04-28 RX ADMIN — FENTANYL CITRATE 50 MCG: 50 INJECTION, SOLUTION INTRAMUSCULAR; INTRAVENOUS at 08:26

## 2023-04-28 RX ADMIN — FENTANYL CITRATE 50 MCG: 50 INJECTION, SOLUTION INTRAMUSCULAR; INTRAVENOUS at 08:21

## 2023-04-28 ASSESSMENT — PAIN SCALES - GENERAL
PAINLEVEL_OUTOF10: 0
PAINLEVEL_OUTOF10: 5
PAINLEVEL_OUTOF10: 0

## 2023-04-28 ASSESSMENT — PAIN - FUNCTIONAL ASSESSMENT
PAIN_FUNCTIONAL_ASSESSMENT: ACTIVITIES ARE NOT PREVENTED
PAIN_FUNCTIONAL_ASSESSMENT: 0-10

## 2023-04-28 ASSESSMENT — PAIN DESCRIPTION - DESCRIPTORS: DESCRIPTORS: ACHING;SORE

## 2023-04-28 ASSESSMENT — PAIN DESCRIPTION - LOCATION: LOCATION: ABDOMEN

## 2023-04-28 NOTE — ANESTHESIA PRE PROCEDURE
Department of Anesthesiology  Preprocedure Note       Name:  Sharona Martinez   Age:  74 y.o.  :  1955                                          MRN:  [id number]         Date:  2023      Surgeon: Jia Martinez):  Fish De Paz DO    Procedure: Procedure(s):  OPEN INCISIONAL HERNIA REPAIR WITH MESH    Medications prior to admission:   Prior to Admission medications    Medication Sig Start Date End Date Taking? Authorizing Provider   sertraline (ZOLOFT) 100 MG tablet Take 1 tablet by mouth daily Total 150 mg   Yes Historical Provider, MD   sertraline (ZOLOFT) 50 MG tablet Take 1 tablet by mouth daily Total  150 mg   Yes Historical Provider, MD   Multiple Vitamins-Minerals (THERAPEUTIC MULTIVITAMIN-MINERALS) tablet Take 1 tablet by mouth daily   Yes Historical Provider, MD   Aspirin-Salicylamide-Caffeine (ARTHRITIS STRENGTH BC POWDER PO) Take 1 Dose by mouth daily   Yes Historical Provider, MD   sulindac (CLINORIL) 200 MG tablet Take 1 tablet by mouth daily 23   Historical Provider, MD   esomeprazole (NEXIUM) 40 MG delayed release capsule Take 40 mg by mouth 2 times daily 17   Ar Automatic Reconciliation   ibuprofen (ADVIL;MOTRIN) 800 MG tablet Take 800 mg by mouth every 8 hours as needed 3/8/22   Ar Automatic Reconciliation   tamsulosin (FLOMAX) 0.4 MG capsule Take 0.4 mg by mouth 20   Ar Automatic Reconciliation       Current medications:    No current facility-administered medications for this encounter. Allergies:     Allergies   Allergen Reactions    Ampicillin Diarrhea    Codeine Nausea Only       Problem List:    Patient Active Problem List   Diagnosis Code    Chronic cholecystitis with calculus K80.10    Impotence of organic origin N52.9    History of Hodgkin's lymphoma Z85.71    Hodgkin's lymphoma (HCC) C81.90    GERD (gastroesophageal reflux disease) K21.9       Past Medical History:        Diagnosis Date    Acid reflux     Arthritis     Basal cell carcinoma of skin    

## 2023-04-28 NOTE — DISCHARGE INSTRUCTIONS
surgery. Your wound appears very red, hot, painful or swollen. Excessive bleeding occurs form the incision. *Between the hours 9-5 Monday-Friday please call the office at [phone].   If you do not receive a call back the same day, please do not hesitate to call my cell phone at [phone]    *If there is a medical emergency please go to the nearest emergency room immediately and do not hesitate to call my cell phone    [phone], after hours please call my cell phone

## 2023-04-28 NOTE — OP NOTE
Open incarcerated incisional hernia repair with mesh    Patient Name: Sari Farah DATE:2023    : 1955    AGE: 74 y.o. Surgeon: Summer Francisco DO    Anesthesiologist: Anesthesiologist: Nanci Fletcher MD  CRNA: LIBORIO Sanders CRNA    Anesthesia: General    Surgical Assist: Circulator: Patricia Bautista RN  Surgical Assistant: Ebony Fitzgerald  Scrub Person First: Demi Hernandez    PreOp DX: Incisional hernia, without obstruction or gangrene [K43.2]    PostOp DX: Incisional hernia 3.0 cm with incarcerated preperitoneal fat    Implant:   Implant Name Type Inv. Item Serial No.  Lot No. LRB No. Used Action   PATCH JESSICA M DIA2. 5IN CIR W/ STRP [employer] - [id number]  PATCH JESSICA M DIA2. 5IN CIR W/ STRP [employer] [id number] BARD DAVOL-WD [id number] N/A 1 Implanted         Procedure Details: After informed consent was obtained the patient was taken to the operating room and placed in the supine position. General anesthesia was administered by the anesthetist to titrate to effect. The abdomen was prepped and draped in the usual sterile fashion and a timeout procedure was performed. Next  Using a 15 blade scalpel was used to make an incision over the previous trocar site. Bovie electrocautery was used to dissect down to the anterior rectus fascia. There was a 3.0cm fascial defect with incarcerated pre peritoneal fat and hernia sac that was excised. We then inserted 6.0cm circular coated mesh and pulled the tabs against the abdominal wall so the mesh laid flat. It was secured along the tabs and it's perimeter using 0 ethibond suture. The fascial defect over the mesh was then closed with 0 PDS in the usual fashion. The wound was hemostatic. The deep dermis was closed with 3-0 vicryl in a simple interrupted fashion and   skin incisions was closed with 4-0 Monocryl in a subcuticular manner. Dermabond dressing was applied.   The patient was subsequently extubated,

## 2023-04-28 NOTE — ANESTHESIA POSTPROCEDURE EVALUATION
Department of Anesthesiology  Postprocedure Note    Patient: Gualberto Bunn  MRN: [id number]  YOB: 1955  Date of evaluation: 4/28/2023      Procedure Summary     Date: 04/28/23 Room / Location: [facility] MAIN 01 / [facility] MAIN OR    Anesthesia Start: 0813 Anesthesia Stop: 9782    Procedure: OPEN INCISIONAL HERNIA REPAIR WITH MESH Diagnosis:       Incisional hernia, without obstruction or gangrene      (Incisional hernia, without obstruction or gangrene [K43.2])    Surgeons: Quintin Cm DO Responsible Provider: Vicky Manning MD    Anesthesia Type: General ASA Status: 2          Anesthesia Type: General    Nakul Phase I: Nakul Score: 10    Nakul Phase II: Nakul Score: 10      Anesthesia Post Evaluation    Patient location during evaluation: PACU  Patient participation: complete - patient participated  Level of consciousness: awake  Airway patency: patent  Nausea & Vomiting: no vomiting  Complications: no  Cardiovascular status: hemodynamically stable  Respiratory status: acceptable  Hydration status: stable

## 2023-04-28 NOTE — H&P
New Patient       Incisional Umbilical hernia         Assessment:      ICD-10-CM     1. Incisional hernia, without obstruction or gangrene  K43.2 SCHEDULE SURGERY       2. Obesity (BMI 30-39. 9)  E66.9               Plan: I recommended open incisional hernia repair with mesh. The risks and benefits of the procedure were reviewed with the patient including infection, bleeding, need for repeat procedure, injury to surrounding structures, recurrent hernia, chronic pain. Questions were answered and consent was obtained. HPI:  Willard Mooney is a 74 y.o. male who is here today for umbilical hernia. He underwent laparoscopic cholecystectomy with Dr. Vicente Jara 3/2022 and shortly there after had a coughing episode and developed a hernia at the incision site. He feels the area has not changed in size but does cause some occasional nausea- no vomiting. He does tend to have a dry cough at night. He also had open repair of incarcerated umbilical hernia in 9503 and believes mesh was used. He did well with this surgery. Allergies: Allergies   Allergen Reactions    Ampicillin Diarrhea    Codeine Nausea Only         Medication Review:         Current Outpatient Medications on File Prior to Visit   Medication Sig Dispense Refill    esomeprazole (NEXIUM) 40 MG delayed release capsule Take 40 mg by mouth 2 times daily        ibuprofen (ADVIL;MOTRIN) 800 MG tablet Take 800 mg by mouth every 8 hours as needed        sertraline (ZOLOFT) 100 MG tablet Take 100 mg by mouth daily        tamsulosin (FLOMAX) 0.4 MG capsule Take 0.4 mg by mouth        celecoxib (CELEBREX) 100 MG capsule Take by mouth 2 times daily (Patient not taking: Reported on 3/9/2023)        pregabalin (LYRICA) 25 MG capsule Take 25 mg by mouth 2 times daily. (Patient not taking: Reported on 3/9/2023)          No current facility-administered medications on file prior to visit.          Systems Review:  Review of Systems   Constitutional:  Negative for

## 2023-05-17 ENCOUNTER — CLINICAL DOCUMENTATION (OUTPATIENT)
Age: 68
End: 2023-05-17

## 2023-05-17 ENCOUNTER — OFFICE VISIT (OUTPATIENT)
Age: 68
End: 2023-05-17

## 2023-05-17 VITALS
TEMPERATURE: 98 F | WEIGHT: 227 LBS | HEIGHT: 70 IN | OXYGEN SATURATION: 98 % | SYSTOLIC BLOOD PRESSURE: 128 MMHG | HEART RATE: 98 BPM | RESPIRATION RATE: 20 BRPM | BODY MASS INDEX: 32.5 KG/M2 | DIASTOLIC BLOOD PRESSURE: 80 MMHG

## 2023-05-17 DIAGNOSIS — Z98.890 S/P HERNIA REPAIR: Primary | ICD-10-CM

## 2023-05-17 DIAGNOSIS — R68.81 EARLY SATIETY: ICD-10-CM

## 2023-05-17 DIAGNOSIS — R11.0 CHRONIC NAUSEA: ICD-10-CM

## 2023-05-17 DIAGNOSIS — Z87.19 S/P HERNIA REPAIR: Primary | ICD-10-CM

## 2023-05-17 PROCEDURE — 99024 POSTOP FOLLOW-UP VISIT: CPT | Performed by: SURGERY

## 2023-05-17 NOTE — PROGRESS NOTES
Chief Complaint   Patient presents with    Post-Op Check     Open incarcerated incisional hernia repair with mesh    1. Have you been to the ER, urgent care clinic since your last visit? Hospitalized since your last visit? No    2. Have you seen or consulted any other health care providers outside of the [address] [place] [address] since your last visit? Include any pap smears or colon screening.  No
collection is the recommended specimen for testing these patients. [place] CHOLANGIOGRAM OR  Narrative: Intraoperative cholangiogram    HISTORY: Cholecystitis. Cholecystectomy. Fluoroscopy time: 56 seconds  # of images: 1    Proximal portion of the common bile duct is not well filled with contrast.  Remaining most of the common bile duct shows normal caliber. No filling defect. Contrast seen in the duodenum with reflux into the pancreatic duct. Impression: As described. Correlate with procedure note needed. Physical Exam:  /80   Pulse 98   Temp 98 °F (36.7 °C)   Resp 20   Ht 5' 10\" (1.778 m)   Wt 227 lb (103 kg)   SpO2 98%   BMI 32.57 kg/m²  BMI: Body mass index is 32.57 kg/m². Physical Exam  Abdominal:      Palpations: Abdomen is soft. Comments: Incision clean, dry, intact       I have reviewed the information entered by the clinical staff and/or patient and verified it as accurate or edited where necessary.      Electronically signed by:    Dawna Cummings DO, MPH

## (undated) DEVICE — PACK PROCEDURE SURG LAPAROSCOPY 17X7 MM BRTRC PRIMUS

## (undated) DEVICE — DRAIN SURG L0.75IN TRCR

## (undated) DEVICE — ADHESIVE SKIN CLSR 0.7ML TOP DERMBND ADV

## (undated) DEVICE — Device

## (undated) DEVICE — GLOVE SURG SZ 65 L12IN FNGR THK79MIL GRN LTX FREE

## (undated) DEVICE — ICE BAG INSERTS

## (undated) DEVICE — REM POLYHESIVE ADULT PATIENT RETURN ELECTRODE: Brand: VALLEYLAB

## (undated) DEVICE — BLANKET WRM AD W50XL85.8IN PACU FULL BODY FORC AIR

## (undated) DEVICE — SCISSORS RMFG LAPAROSC 5MM -- LAWSON OEM ITEM 112765

## (undated) DEVICE — CATHETER URET 4FR L70CM SGL LUMN W/ HUB OPN END FLEXIMA

## (undated) DEVICE — CATHETER IV 14GA L2IN POLYUR STR ORNG HUB SFTY RADPQ DISP

## (undated) DEVICE — SOLUTION IV 1000ML 0.9% SOD CHL

## (undated) DEVICE — SUTURE ETHBND EXCEL SZ 0 L18IN NONABSORBABLE GRN L36MM CT-1 CX21D

## (undated) DEVICE — KIT CLN UP BON SECOURS MARYV

## (undated) DEVICE — DRAPE XR C ARM 41X74IN LF --

## (undated) DEVICE — DRAPE TOWEL: Brand: CONVERTORS

## (undated) DEVICE — GAUZE SPONGES,8 PLY: Brand: CURITY

## (undated) DEVICE — TROCAR ENDOSCP L100MM DIA12MM STBL SL BLDELSS ENDOPATH XCEL

## (undated) DEVICE — SUTURE PDS II SZ 0 L27IN ABSRB VLT L36MM CT-1 1/2 CIR Z340H

## (undated) DEVICE — TISSUE RETRIEVAL SYSTEM: Brand: INZII RETRIEVAL SYSTEM

## (undated) DEVICE — SUTURE VCRL SZ 2-0 L54IN ABSRB VLT W/O NDL POLYGLACTIN 910 J618H

## (undated) DEVICE — DRAPE,T,LAPARO,TRANS,STERILE: Brand: MEDLINE

## (undated) DEVICE — BLADE ES ELASTOMERIC COAT INSUL DURABLE BEND UPTO 90DEG

## (undated) DEVICE — APPLIER RMFG CLIP R MED/LG --

## (undated) DEVICE — SET SUCT IRR TIP DISP STRYKEFLOW2

## (undated) DEVICE — INTENDED FOR TISSUE SEPARATION, AND OTHER PROCEDURES THAT REQUIRE A SHARP SURGICAL BLADE TO PUNCTURE OR CUT.: Brand: BARD-PARKER ® STAINLESS STEEL BLADES

## (undated) DEVICE — GLOVE SURG SZ 6 CRM LTX FREE POLYISOPRENE POLYMER BEAD ANTI

## (undated) DEVICE — SLEEVE TRCR L100MM DIA5MM UNIV STBL FOR BLDELSS DIL TIP

## (undated) DEVICE — SUTURE MCRYL SZ 4-0 L27IN ABSRB UD L24MM PS-1 3/8 CIR PRIM Y935H

## (undated) DEVICE — SUTURE VCRL SZ 3-0 L27IN ABSRB UD L26MM SH 1/2 CIR J416H

## (undated) DEVICE — SOLUTION IRRIG 1000ML H2O STRL BLT

## (undated) DEVICE — ELECTRODE PT RET AD L9FT HI MOIST COND ADH HYDRGEL CORDED

## (undated) DEVICE — GARMENT,MEDLINE,DVT,INT,CALF,MED, GEN2: Brand: MEDLINE

## (undated) DEVICE — PACK PROCEDURE SURG MAJ W/ BASIN LF

## (undated) DEVICE — TROCAR ENDOSCP BLDELSS 12X100 MM W/ HNDL STBL SL OPT TIP

## (undated) DEVICE — SYR 10ML LUER LOK 1/5ML GRAD --

## (undated) DEVICE — TROCAR LAP L100MM DIA5MM BLDELSS W/ STBL SL ENDOPATH XCEL